# Patient Record
Sex: FEMALE | Race: WHITE | NOT HISPANIC OR LATINO | Employment: OTHER | URBAN - METROPOLITAN AREA
[De-identification: names, ages, dates, MRNs, and addresses within clinical notes are randomized per-mention and may not be internally consistent; named-entity substitution may affect disease eponyms.]

---

## 2017-01-16 RX ORDER — THYROID,PORK 15 MG
TABLET ORAL
Qty: 90 TABLET | Refills: 0 | Status: SHIPPED | OUTPATIENT
Start: 2017-01-16 | End: 2017-02-14 | Stop reason: SDUPTHER

## 2017-01-16 RX ORDER — VENLAFAXINE HYDROCHLORIDE 75 MG/1
CAPSULE, EXTENDED RELEASE ORAL
Qty: 90 CAPSULE | Refills: 0 | Status: SHIPPED | OUTPATIENT
Start: 2017-01-16 | End: 2017-02-14 | Stop reason: SDUPTHER

## 2017-02-14 RX ORDER — HYDROXYZINE HYDROCHLORIDE 25 MG/1
25 TABLET, FILM COATED ORAL EVERY 8 HOURS PRN
Qty: 90 TABLET | Refills: 0 | Status: SHIPPED | OUTPATIENT
Start: 2017-02-14 | End: 2018-09-11 | Stop reason: SDUPTHER

## 2017-02-14 RX ORDER — TRIAMCINOLONE ACETONIDE 1 MG/G
1 CREAM TOPICAL 2 TIMES DAILY
Qty: 15 G | Refills: 0 | Status: SHIPPED | OUTPATIENT
Start: 2017-02-14 | End: 2017-07-11 | Stop reason: SDUPTHER

## 2017-02-14 RX ORDER — LEVOTHYROXINE AND LIOTHYRONINE 9.5; 2.25 UG/1; UG/1
15 TABLET ORAL DAILY
Qty: 90 TABLET | Refills: 0 | Status: SHIPPED | OUTPATIENT
Start: 2017-02-14 | End: 2017-02-20 | Stop reason: SDUPTHER

## 2017-02-14 RX ORDER — VENLAFAXINE HYDROCHLORIDE 75 MG/1
75 CAPSULE, EXTENDED RELEASE ORAL DAILY
Qty: 90 CAPSULE | Refills: 0 | Status: SHIPPED | OUTPATIENT
Start: 2017-02-14 | End: 2017-02-28 | Stop reason: SDUPTHER

## 2017-02-14 RX ORDER — SUMATRIPTAN 50 MG/1
TABLET, FILM COATED ORAL
Qty: 9 TABLET | Refills: 3 | Status: SHIPPED | OUTPATIENT
Start: 2017-02-14 | End: 2017-02-28 | Stop reason: SDUPTHER

## 2017-02-16 RX ORDER — THYROID,PORK 15 MG
TABLET ORAL
Qty: 90 TABLET | Refills: 0 | Status: SHIPPED | OUTPATIENT
Start: 2017-02-16 | End: 2017-02-21 | Stop reason: SDUPTHER

## 2017-02-20 RX ORDER — LEVOTHYROXINE AND LIOTHYRONINE 9.5; 2.25 UG/1; UG/1
15 TABLET ORAL DAILY
Qty: 135 TABLET | Refills: 0 | Status: SHIPPED | OUTPATIENT
Start: 2017-02-20 | End: 2017-02-28 | Stop reason: SDUPTHER

## 2017-02-21 RX ORDER — LEVOTHYROXINE AND LIOTHYRONINE 9.5; 2.25 UG/1; UG/1
TABLET ORAL
Qty: 90 TABLET | Refills: 2 | Status: SHIPPED | OUTPATIENT
Start: 2017-02-21 | End: 2017-03-27 | Stop reason: SDUPTHER

## 2017-02-28 RX ORDER — SUMATRIPTAN 50 MG/1
TABLET, FILM COATED ORAL
Qty: 9 TABLET | Refills: 3 | Status: SHIPPED | OUTPATIENT
Start: 2017-02-28 | End: 2018-07-02 | Stop reason: SDUPTHER

## 2017-02-28 RX ORDER — LEVOTHYROXINE AND LIOTHYRONINE 9.5; 2.25 UG/1; UG/1
15 TABLET ORAL DAILY
Qty: 90 TABLET | Refills: 0 | Status: SHIPPED | OUTPATIENT
Start: 2017-02-28 | End: 2017-03-27 | Stop reason: SDUPTHER

## 2017-02-28 RX ORDER — VENLAFAXINE HYDROCHLORIDE 75 MG/1
75 CAPSULE, EXTENDED RELEASE ORAL DAILY
Qty: 90 CAPSULE | Refills: 0 | Status: SHIPPED | OUTPATIENT
Start: 2017-02-28 | End: 2017-07-10 | Stop reason: SDUPTHER

## 2017-03-27 RX ORDER — LEVOTHYROXINE AND LIOTHYRONINE 9.5; 2.25 UG/1; UG/1
15 TABLET ORAL 3 TIMES DAILY
Qty: 270 TABLET | Refills: 0 | Status: SHIPPED | OUTPATIENT
Start: 2017-03-27 | End: 2017-07-10 | Stop reason: SDUPTHER

## 2017-07-10 RX ORDER — LEVOTHYROXINE AND LIOTHYRONINE 9.5; 2.25 UG/1; UG/1
15 TABLET ORAL 3 TIMES DAILY
Qty: 270 TABLET | Refills: 0 | Status: SHIPPED | OUTPATIENT
Start: 2017-07-10 | End: 2017-07-10 | Stop reason: SDUPTHER

## 2017-07-10 RX ORDER — VENLAFAXINE HYDROCHLORIDE 75 MG/1
75 CAPSULE, EXTENDED RELEASE ORAL DAILY
Qty: 90 CAPSULE | Refills: 0 | Status: SHIPPED | OUTPATIENT
Start: 2017-07-10 | End: 2017-07-27 | Stop reason: SDUPTHER

## 2017-07-10 RX ORDER — VENLAFAXINE HYDROCHLORIDE 75 MG/1
75 CAPSULE, EXTENDED RELEASE ORAL DAILY
Qty: 90 CAPSULE | Refills: 0 | Status: SHIPPED | OUTPATIENT
Start: 2017-07-10 | End: 2017-07-10 | Stop reason: SDUPTHER

## 2017-07-10 RX ORDER — LEVOTHYROXINE AND LIOTHYRONINE 9.5; 2.25 UG/1; UG/1
15 TABLET ORAL 3 TIMES DAILY
Qty: 270 TABLET | Refills: 0 | Status: SHIPPED | OUTPATIENT
Start: 2017-07-10 | End: 2017-07-27 | Stop reason: SDUPTHER

## 2017-07-11 RX ORDER — TRIAMCINOLONE ACETONIDE 1 MG/G
1 CREAM TOPICAL 2 TIMES DAILY
Qty: 45 G | Refills: 2 | Status: SHIPPED | OUTPATIENT
Start: 2017-07-11 | End: 2018-07-17 | Stop reason: SDUPTHER

## 2017-07-27 RX ORDER — LEVOTHYROXINE AND LIOTHYRONINE 9.5; 2.25 UG/1; UG/1
15 TABLET ORAL 3 TIMES DAILY
Qty: 15 TABLET | Refills: 0 | Status: SHIPPED | OUTPATIENT
Start: 2017-07-27 | End: 2017-10-16 | Stop reason: SDUPTHER

## 2017-07-27 RX ORDER — VENLAFAXINE HYDROCHLORIDE 75 MG/1
75 CAPSULE, EXTENDED RELEASE ORAL DAILY
Qty: 5 CAPSULE | Refills: 0 | Status: SHIPPED | OUTPATIENT
Start: 2017-07-27 | End: 2017-10-16 | Stop reason: SDUPTHER

## 2017-10-17 RX ORDER — VENLAFAXINE HYDROCHLORIDE 75 MG/1
CAPSULE, EXTENDED RELEASE ORAL
Qty: 90 CAPSULE | Refills: 1 | Status: SHIPPED | OUTPATIENT
Start: 2017-10-17 | End: 2018-04-14 | Stop reason: SDUPTHER

## 2017-10-17 RX ORDER — THYROID,PORK 15 MG
TABLET ORAL
Qty: 270 TABLET | Refills: 1 | Status: SHIPPED | OUTPATIENT
Start: 2017-10-17 | End: 2018-04-14 | Stop reason: SDUPTHER

## 2017-10-26 ENCOUNTER — OFFICE VISIT (OUTPATIENT)
Dept: FAMILY MEDICINE CLINIC | Facility: CLINIC | Age: 72
End: 2017-10-26

## 2017-10-26 VITALS
BODY MASS INDEX: 21.5 KG/M2 | HEIGHT: 67 IN | OXYGEN SATURATION: 98 % | RESPIRATION RATE: 16 BRPM | SYSTOLIC BLOOD PRESSURE: 114 MMHG | WEIGHT: 137 LBS | DIASTOLIC BLOOD PRESSURE: 70 MMHG | HEART RATE: 68 BPM

## 2017-10-26 DIAGNOSIS — E78.2 MIXED HYPERLIPIDEMIA: ICD-10-CM

## 2017-10-26 DIAGNOSIS — Z12.39 SCREENING BREAST EXAMINATION: ICD-10-CM

## 2017-10-26 DIAGNOSIS — M79.671 PAIN IN RIGHT FOOT: ICD-10-CM

## 2017-10-26 DIAGNOSIS — E03.8 OTHER SPECIFIED HYPOTHYROIDISM: ICD-10-CM

## 2017-10-26 DIAGNOSIS — Z00.00 ROUTINE GENERAL MEDICAL EXAMINATION AT A HEALTH CARE FACILITY: Primary | ICD-10-CM

## 2017-10-26 DIAGNOSIS — M70.22 OLECRANON BURSITIS OF LEFT ELBOW: ICD-10-CM

## 2017-10-26 DIAGNOSIS — Z23 NEED FOR VACCINATION: ICD-10-CM

## 2017-10-26 PROBLEM — E03.9 HYPOTHYROIDISM: Status: ACTIVE | Noted: 2017-10-26

## 2017-10-26 LAB
ALBUMIN SERPL-MCNC: 4.5 G/DL (ref 3.5–5.2)
ALBUMIN/GLOB SERPL: 2 G/DL
ALP SERPL-CCNC: 64 U/L (ref 39–117)
ALT SERPL-CCNC: 12 U/L (ref 1–33)
AST SERPL-CCNC: 12 U/L (ref 1–32)
BILIRUB SERPL-MCNC: 0.3 MG/DL (ref 0.1–1.2)
BUN SERPL-MCNC: 15 MG/DL (ref 8–23)
BUN/CREAT SERPL: 21.1 (ref 7–25)
CALCIUM SERPL-MCNC: 10.1 MG/DL (ref 8.6–10.5)
CHLORIDE SERPL-SCNC: 100 MMOL/L (ref 98–107)
CHOLEST SERPL-MCNC: 181 MG/DL (ref 0–200)
CHOLEST/HDLC SERPL: 2.7 {RATIO}
CO2 SERPL-SCNC: 26 MMOL/L (ref 22–29)
CREAT SERPL-MCNC: 0.71 MG/DL (ref 0.57–1)
GFR SERPLBLD CREATININE-BSD FMLA CKD-EPI: 81 ML/MIN/1.73
GFR SERPLBLD CREATININE-BSD FMLA CKD-EPI: 98 ML/MIN/1.73
GLOBULIN SER CALC-MCNC: 2.2 GM/DL
GLUCOSE SERPL-MCNC: 95 MG/DL (ref 65–99)
HDLC SERPL-MCNC: 67 MG/DL (ref 40–60)
LDLC SERPL CALC-MCNC: 97 MG/DL (ref 0–100)
POTASSIUM SERPL-SCNC: 5.3 MMOL/L (ref 3.5–5.2)
PROT SERPL-MCNC: 6.7 G/DL (ref 6–8.5)
SODIUM SERPL-SCNC: 138 MMOL/L (ref 136–145)
TRIGL SERPL-MCNC: 86 MG/DL (ref 0–150)
TSH SERPL DL<=0.005 MIU/L-ACNC: 1.14 MIU/ML (ref 0.27–4.2)
URATE SERPL-MCNC: 3 MG/DL (ref 2.4–5.7)
VLDLC SERPL CALC-MCNC: 17.2 MG/DL (ref 5–40)

## 2017-10-26 PROCEDURE — G0439 PPPS, SUBSEQ VISIT: HCPCS | Performed by: FAMILY MEDICINE

## 2017-10-26 PROCEDURE — 90662 IIV NO PRSV INCREASED AG IM: CPT | Performed by: FAMILY MEDICINE

## 2017-10-26 PROCEDURE — 99213 OFFICE O/P EST LOW 20 MIN: CPT | Performed by: FAMILY MEDICINE

## 2017-10-26 PROCEDURE — G0008 ADMIN INFLUENZA VIRUS VAC: HCPCS | Performed by: FAMILY MEDICINE

## 2017-10-26 RX ORDER — PHENOL 1.4 %
600 AEROSOL, SPRAY (ML) MUCOUS MEMBRANE 2 TIMES DAILY WITH MEALS
COMMUNITY
End: 2020-04-21

## 2017-10-26 NOTE — PATIENT INSTRUCTIONS
Medicare Wellness  Personal Prevention Plan of Service   I will call you with lab and imaging results.   Date of Office Visit:  10/26/2017  Encounter Provider:  Jorge Vázquez MD  Place of Service:  [facility]  Patient Name: Rita Hendrix  :  1945    As part of the Medicare Wellness portion of your visit today, we are providing you with this personalized preventive plan of services (PPPS). This plan is based upon recommendations of the [place] Preventive Services Task Force (USPSTF) and the Advisory Committee on Immunization Practices (ACIP).    This lists the preventive care services that should be considered, and provides dates of when you are due. Items listed as completed are up-to-date and do not require any further intervention.    Health Maintenance   Topic Date Due   • MAMMOGRAM  2016   • MEDICARE ANNUAL WELLNESS  10/26/2018   • LIPID PANEL  10/26/2018   • TDAP/TD VACCINES (2 - Td) 02/10/2022   • COLONOSCOPY  2023   • HEPATITIS C SCREENING  Completed   • INFLUENZA VACCINE  Completed   • PNEUMOCOCCAL VACCINES (65+ LOW/MEDIUM RISK)  Completed   • ZOSTER VACCINE  Completed       Orders Placed This Encounter   Procedures   • Mammo Screening Bilateral With CAD     Standing Status:   Future     Standing Expiration Date:   10/27/2018     Order Specific Question:   Reason for Exam:     Answer:   screening   • Flu Vaccine High Dose PF 65YR+ ([id number])   • Comprehensive Metabolic Panel   • Lipid Panel With / Chol / HDL Ratio   • TSH   • Uric Acid   • Ambulatory Referral to Orthopedic Surgery     Referral Priority:   Routine     Referral Type:   Consultation     Referral Reason:   Specialty Services Required     Referred to Provider:   John Kulkarni MD     Requested Specialty:   Orthopedic Surgery     Number of Visits Requested:   1       Return in about 1 year (around 10/26/2018) for Annual physical.

## 2017-10-26 NOTE — PROGRESS NOTES
Medicare Subsequent Wellness Visit  Subjective   History of Present Illness    Rita Hendrix is a 72 y.o. female who presents for an Medicare Wellness Visit. In addition, we addressed the following health issues:  1)Olecranon Bursitis x 1 mo.  Has been using ice and Ibu. It is not hot but swollen and very firm. It is somewhat painful. She hit it once while remodeling recently.  2) She reports soreness Rt lateral foot and mild pain in right toe. She is worried about possible gout.   3)Rita has a history of chronic hypothyroidism and has been well controlled on current dose of replacement.She report no concerning symptoms: no cold intolerance, fatigue or hair loss.         PMH, PSH, SocHx, FamHx, Allergies, and Medications: Reviewed and updated in the history section of chart.  Family History   Problem Relation Age of Onset   • Stroke Mother    • Parkinsonism Mother    • Cancer Father      Pancreatic       Social History     Social History Narrative    Lives at home with        Allergies   Allergen Reactions   • Penicillins Hives   • Sulfa Antibiotics Itching       Outpatient Medications Prior to Visit   Medication Sig Dispense Refill   • ARMOUR THYROID 15 MG tablet TAKE 1 TABLET THREE TIMES DAILY 270 tablet 1   • venlafaxine XR (EFFEXOR-XR) 75 MG 24 hr capsule TAKE 1 CAPSULE EVERY DAY (NEED MD APPOINTMENT) 90 capsule 1   • hydrOXYzine (ATARAX) 25 MG tablet Take 1 tablet by mouth Every 8 (Eight) Hours As Needed for itching. 90 tablet 0   • SUMAtriptan (IMITREX) 50 MG tablet Take one tablet at onset of headache. May repeat dose one time in 2 hours if headache not relieved. 9 tablet 3   • triamcinolone (KENALOG) 0.1 % cream Apply 1 application topically 2 (Two) Times a Day. 45 g 2     No facility-administered medications prior to visit.         Patient Active Problem List   Diagnosis   • Hypothyroidism   • Mixed hyperlipidemia         Patient Care Team:  Jorge Vázquez MD as PCP - General  Bradford Lyn MD  as PCP - Claims Attributed  Recent Hospitalizations:  none    Age-appropriate Screening Schedule:  Refer to the list below for future screening recommendations based on patient's age. Orders for these recommended tests are listed in the plan section. The patient has been provided with a written plan.    Health Maintenance   Topic Date Due   • MAMMOGRAM  05/05/2016   • MEDICARE ANNUAL WELLNESS  10/26/2018   • LIPID PANEL  10/26/2018   • TDAP/TD VACCINES (2 - Td) 02/10/2022   • COLONOSCOPY  03/08/2023   • HEPATITIS C SCREENING  Completed   • INFLUENZA VACCINE  Completed   • PNEUMOCOCCAL VACCINES (65+ LOW/MEDIUM RISK)  Completed   • ZOSTER VACCINE  Completed       Depression Screen:   PHQ-2/PHQ-9 Depression Screening 10/26/2017   Little interest or pleasure in doing things 0   Feeling down, depressed, or hopeless 0   Total Score 0       Functional and Cognitive Screening:  Functional & Cognitive Status 10/26/2017   Do you have difficulty preparing food and eating? No   Do you have difficulty bathing yourself, getting dressed or grooming yourself? No   Do you have difficulty using the toilet? No   Do you have difficulty moving around from place to place? No   Do you have trouble with steps or getting out of a bed or a chair? No   In the past year have you fallen or experienced a near fall? Yes   Current Diet Well Balanced Diet   Dental Exam Up to date   Eye Exam Up to date   Exercise (times per week) 2 times per week   Current Exercise Activities Include Swimming   Do you need help using the phone?  No   Are you deaf or do you have serious difficulty hearing?  No   Do you need help with transportation? No   Do you need help shopping? No   Do you need help preparing meals?  No   Do you need help with housework?  No   Do you need help taking your medications? No   Do you need help managing money? No   Have you felt unusual stress, anger or loneliness in the last month? No   Who do you live with? Spouse   If you need help,  "do you have trouble finding someone available to you? No   Have you been bothered in the last four weeks by sexual problems? No   Do you have difficulty concentrating, remembering or making decisions? No     Does the patient have evidence of cognitive impairment? none      Review of Systems   All other systems reviewed and are negative.      Objective     Vitals:    10/26/17 0901   BP: 114/70   Pulse: 68   Resp: 16   SpO2: 98%   Weight: 137 lb (62.1 kg)   Height: 66.5\" (168.9 cm)       Body mass index is 21.78 kg/(m^2).    PHYSICAL EXAM  Vitals reviewed and on chart.  HEENT: PERRLA, EOMI. Oral mucosa moist,   No LAD.  CV: RRR, no murmurs, rubs, clicks or gallops  LUNGS: CTA bilaterally  EXT: Left elbow has large, swollen bursa, no redness or warmth, FROM in bilateral upper and lower ext  NEURO: CN II - XII grossly intact        ASSESSMENT AND PLAN      Problem List Items Addressed This Visit        Cardiovascular and Mediastinum    Mixed hyperlipidemia    Relevant Orders    Lipid Panel With / Chol / HDL Ratio (Completed)       Endocrine    Hypothyroidism    Relevant Orders    TSH (Completed)  Well managed on current dose of thyroid replacement. TSH reviewed and on chart.  Well refill medications as needed.      Other Visit Diagnoses     Routine general medical examination at a health care facility    -  Primary    Relevant Orders    Comprehensive Metabolic Panel (Completed)    Olecranon bursitis of left elbow        Relevant Orders  Wrapped elbow with coband tape and referred.    Ambulatory Referral to Orthopedic Surgery (Completed)    Pain in right foot        Relevant Orders    Uric Acid (Completed)    Screening breast examination        Relevant Orders    Mammo Screening Bilateral With CAD    Need for vaccination        Relevant Orders    Flu Vaccine High Dose PF 65YR+ ([id number]) (Completed)          Orders:  Orders Placed This Encounter   Procedures   • Mammo Screening Bilateral With CAD   • Flu Vaccine High " Dose PF 65YR+ ([id number])   • Comprehensive Metabolic Panel   • Lipid Panel With / Chol / HDL Ratio   • TSH   • Uric Acid   • Ambulatory Referral to Orthopedic Surgery       Follow Up:  Return in about 1 year (around 10/26/2018) for Annual physical.     ADVANCED DIRECTIVES: Advised    An After Visit Summary and PPPS with all of these plans were given to the patient.

## 2017-10-31 DIAGNOSIS — E87.5 HYPERPOTASSEMIA: Primary | ICD-10-CM

## 2017-11-02 LAB — POTASSIUM SERPL-SCNC: 4.4 MMOL/L (ref 3.5–5.2)

## 2017-11-08 ENCOUNTER — APPOINTMENT (OUTPATIENT)
Dept: WOMENS IMAGING | Facility: HOSPITAL | Age: 72
End: 2017-11-08

## 2017-11-08 PROCEDURE — G0202 SCR MAMMO BI INCL CAD: HCPCS | Performed by: RADIOLOGY

## 2017-11-08 PROCEDURE — 77063 BREAST TOMOSYNTHESIS BI: CPT | Performed by: RADIOLOGY

## 2018-04-16 RX ORDER — THYROID,PORK 15 MG
TABLET ORAL
Qty: 270 TABLET | Refills: 1 | Status: SHIPPED | OUTPATIENT
Start: 2018-04-16 | End: 2018-10-01 | Stop reason: SDUPTHER

## 2018-04-16 RX ORDER — VENLAFAXINE HYDROCHLORIDE 75 MG/1
CAPSULE, EXTENDED RELEASE ORAL
Qty: 90 CAPSULE | Refills: 1 | Status: SHIPPED | OUTPATIENT
Start: 2018-04-16 | End: 2018-10-01 | Stop reason: SDUPTHER

## 2018-07-02 RX ORDER — SUMATRIPTAN 50 MG/1
TABLET, FILM COATED ORAL
Qty: 27 TABLET | Refills: 3 | Status: SHIPPED | OUTPATIENT
Start: 2018-07-02 | End: 2019-02-28 | Stop reason: SDUPTHER

## 2018-07-17 RX ORDER — TRIAMCINOLONE ACETONIDE 1 MG/G
CREAM TOPICAL
Qty: 45 G | Refills: 2 | Status: SHIPPED | OUTPATIENT
Start: 2018-07-17 | End: 2018-12-03 | Stop reason: SDUPTHER

## 2018-07-24 VITALS
SYSTOLIC BLOOD PRESSURE: 136 MMHG | RESPIRATION RATE: 17 BRPM | OXYGEN SATURATION: 94 % | DIASTOLIC BLOOD PRESSURE: 75 MMHG | RESPIRATION RATE: 21 BRPM | HEART RATE: 80 BPM | HEIGHT: 67 IN | HEART RATE: 76 BPM | RESPIRATION RATE: 20 BRPM | HEART RATE: 74 BPM | OXYGEN SATURATION: 97 % | SYSTOLIC BLOOD PRESSURE: 123 MMHG | DIASTOLIC BLOOD PRESSURE: 77 MMHG | TEMPERATURE: 96.8 F | TEMPERATURE: 98.7 F | DIASTOLIC BLOOD PRESSURE: 65 MMHG | HEART RATE: 83 BPM | OXYGEN SATURATION: 98 % | OXYGEN SATURATION: 85 % | DIASTOLIC BLOOD PRESSURE: 70 MMHG | OXYGEN SATURATION: 93 % | OXYGEN SATURATION: 91 % | SYSTOLIC BLOOD PRESSURE: 118 MMHG | DIASTOLIC BLOOD PRESSURE: 88 MMHG | HEART RATE: 71 BPM | SYSTOLIC BLOOD PRESSURE: 122 MMHG | HEART RATE: 77 BPM | RESPIRATION RATE: 23 BRPM | RESPIRATION RATE: 25 BRPM | RESPIRATION RATE: 18 BRPM | RESPIRATION RATE: 16 BRPM | SYSTOLIC BLOOD PRESSURE: 144 MMHG | SYSTOLIC BLOOD PRESSURE: 116 MMHG | WEIGHT: 135 LBS | DIASTOLIC BLOOD PRESSURE: 67 MMHG | RESPIRATION RATE: 26 BRPM | DIASTOLIC BLOOD PRESSURE: 68 MMHG | HEART RATE: 67 BPM | OXYGEN SATURATION: 100 % | SYSTOLIC BLOOD PRESSURE: 131 MMHG | SYSTOLIC BLOOD PRESSURE: 142 MMHG | DIASTOLIC BLOOD PRESSURE: 73 MMHG | HEART RATE: 86 BPM | SYSTOLIC BLOOD PRESSURE: 125 MMHG | HEART RATE: 78 BPM

## 2018-07-25 PROBLEM — Z86.010 PERSONAL HISTORY OF COLONIC POLYPS: Status: ACTIVE | Noted: 2018-07-26

## 2018-07-26 ENCOUNTER — AMBULATORY SURGICAL CENTER (AMBULATORY)
Dept: URBAN - METROPOLITAN AREA SURGERY 17 | Facility: SURGERY | Age: 73
End: 2018-07-26
Payer: MEDICARE

## 2018-07-26 DIAGNOSIS — Z86.010 PERSONAL HISTORY OF COLONIC POLYPS: ICD-10-CM

## 2018-07-26 PROCEDURE — G0105 COLORECTAL SCRN; HI RISK IND: HCPCS | Performed by: INTERNAL MEDICINE

## 2018-07-26 RX ADMIN — PROPOFOL 50 MG: 10 INJECTION, EMULSION INTRAVENOUS at 13:37

## 2018-07-26 RX ADMIN — PROPOFOL 100 MG: 10 INJECTION, EMULSION INTRAVENOUS at 13:32

## 2018-07-26 RX ADMIN — PROPOFOL 50 MG: 10 INJECTION, EMULSION INTRAVENOUS at 13:43

## 2018-07-26 RX ADMIN — LIDOCAINE HYDROCHLORIDE 25 MG: 10 INJECTION, SOLUTION EPIDURAL; INFILTRATION; INTRACAUDAL; PERINEURAL at 13:32

## 2018-07-26 RX ADMIN — PROPOFOL 25 MG: 10 INJECTION, EMULSION INTRAVENOUS at 13:40

## 2018-07-26 RX ADMIN — PROPOFOL 25 MG: 10 INJECTION, EMULSION INTRAVENOUS at 13:37

## 2018-09-11 RX ORDER — HYDROXYZINE HYDROCHLORIDE 25 MG/1
25 TABLET, FILM COATED ORAL EVERY 8 HOURS PRN
Qty: 90 TABLET | Refills: 0 | Status: SHIPPED | OUTPATIENT
Start: 2018-09-11 | End: 2019-02-28 | Stop reason: SDUPTHER

## 2018-10-02 RX ORDER — THYROID,PORK 15 MG
TABLET ORAL
Qty: 270 TABLET | Refills: 0 | Status: SHIPPED | OUTPATIENT
Start: 2018-10-02 | End: 2018-12-01 | Stop reason: SDUPTHER

## 2018-10-02 RX ORDER — VENLAFAXINE HYDROCHLORIDE 75 MG/1
CAPSULE, EXTENDED RELEASE ORAL
Qty: 90 CAPSULE | Refills: 0 | Status: SHIPPED | OUTPATIENT
Start: 2018-10-02 | End: 2018-12-01 | Stop reason: SDUPTHER

## 2018-12-03 RX ORDER — TRIAMCINOLONE ACETONIDE 1 MG/G
CREAM TOPICAL 2 TIMES DAILY
Qty: 453.6 G | Refills: 0 | Status: SHIPPED | OUTPATIENT
Start: 2018-12-03 | End: 2022-08-29

## 2018-12-03 RX ORDER — THYROID,PORK 15 MG
TABLET ORAL
Qty: 30 TABLET | Refills: 0 | Status: SHIPPED | OUTPATIENT
Start: 2018-12-03 | End: 2019-01-11 | Stop reason: SDUPTHER

## 2018-12-03 RX ORDER — VENLAFAXINE HYDROCHLORIDE 75 MG/1
CAPSULE, EXTENDED RELEASE ORAL
Qty: 30 CAPSULE | Refills: 0 | Status: SHIPPED | OUTPATIENT
Start: 2018-12-03 | End: 2019-01-11 | Stop reason: SDUPTHER

## 2019-01-11 RX ORDER — LEVOTHYROXINE AND LIOTHYRONINE 9.5; 2.25 UG/1; UG/1
15 TABLET ORAL 3 TIMES DAILY
Qty: 90 TABLET | Refills: 1 | Status: SHIPPED | OUTPATIENT
Start: 2019-01-11 | End: 2019-01-28 | Stop reason: SDUPTHER

## 2019-01-11 RX ORDER — VENLAFAXINE HYDROCHLORIDE 75 MG/1
75 CAPSULE, EXTENDED RELEASE ORAL DAILY
Qty: 30 CAPSULE | Refills: 1 | Status: SHIPPED | OUTPATIENT
Start: 2019-01-11 | End: 2019-02-28 | Stop reason: SDUPTHER

## 2019-01-11 NOTE — TELEPHONE ENCOUNTER
Ms. Hendrix contacted and made aware that enough medication was sent in to get her to her appointment. She states that [facility] thyroid needs approved, contact number is [phone].

## 2019-01-15 NOTE — TELEPHONE ENCOUNTER
I've called [facility] and mediation it's been approved. Ref number is [id number].   Pt informed.

## 2019-01-28 RX ORDER — LEVOTHYROXINE AND LIOTHYRONINE 9.5; 2.25 UG/1; UG/1
15 TABLET ORAL 3 TIMES DAILY
Qty: 90 TABLET | Refills: 5 | Status: SHIPPED | OUTPATIENT
Start: 2019-01-28 | End: 2019-02-28 | Stop reason: SDUPTHER

## 2019-02-21 DIAGNOSIS — Z13.1 SCREENING FOR DIABETES MELLITUS: Primary | ICD-10-CM

## 2019-02-21 DIAGNOSIS — R53.83 FATIGUE, UNSPECIFIED TYPE: ICD-10-CM

## 2019-02-21 DIAGNOSIS — E03.9 HYPOTHYROIDISM, UNSPECIFIED TYPE: ICD-10-CM

## 2019-02-21 DIAGNOSIS — Z13.6 SCREENING FOR ISCHEMIC HEART DISEASE: ICD-10-CM

## 2019-02-26 LAB
ALBUMIN SERPL-MCNC: 4.3 G/DL (ref 3.5–5.2)
ALBUMIN/GLOB SERPL: 1.8 G/DL
ALP SERPL-CCNC: 54 U/L (ref 39–117)
ALT SERPL-CCNC: 14 U/L (ref 1–33)
AST SERPL-CCNC: 12 U/L (ref 1–32)
BILIRUB SERPL-MCNC: 0.3 MG/DL (ref 0.1–1.2)
BUN SERPL-MCNC: 10 MG/DL (ref 8–23)
BUN/CREAT SERPL: 15.9 (ref 7–25)
CALCIUM SERPL-MCNC: 9.6 MG/DL (ref 8.6–10.5)
CHLORIDE SERPL-SCNC: 103 MMOL/L (ref 98–107)
CHOLEST SERPL-MCNC: 201 MG/DL (ref 0–200)
CO2 SERPL-SCNC: 25.2 MMOL/L (ref 22–29)
CREAT SERPL-MCNC: 0.63 MG/DL (ref 0.57–1)
ERYTHROCYTE [DISTWIDTH] IN BLOOD BY AUTOMATED COUNT: 14.6 % (ref 12.3–15.4)
GLOBULIN SER CALC-MCNC: 2.4 GM/DL
GLUCOSE SERPL-MCNC: 92 MG/DL (ref 65–99)
HCT VFR BLD AUTO: 44.8 % (ref 34–46.6)
HDLC SERPL-MCNC: 66 MG/DL (ref 40–60)
HGB BLD-MCNC: 14.5 G/DL (ref 12–15.9)
LDLC SERPL CALC-MCNC: 117 MG/DL (ref 0–100)
LDLC/HDLC SERPL: 1.78 {RATIO}
MCH RBC QN AUTO: 30.7 PG (ref 26.6–33)
MCHC RBC AUTO-ENTMCNC: 32.4 G/DL (ref 31.5–35.7)
MCV RBC AUTO: 94.7 FL (ref 79–97)
PLATELET # BLD AUTO: 307 10*3/MM3 (ref 140–450)
POTASSIUM SERPL-SCNC: 4.3 MMOL/L (ref 3.5–5.2)
PROT SERPL-MCNC: 6.7 G/DL (ref 6–8.5)
RBC # BLD AUTO: 4.73 10*6/MM3 (ref 3.77–5.28)
SODIUM SERPL-SCNC: 139 MMOL/L (ref 136–145)
TRIGL SERPL-MCNC: 89 MG/DL (ref 0–150)
TSH SERPL DL<=0.005 MIU/L-ACNC: 0.9 MIU/ML (ref 0.27–4.2)
VLDLC SERPL CALC-MCNC: 17.8 MG/DL (ref 5–40)
WBC # BLD AUTO: 6.2 10*3/MM3 (ref 3.4–10.8)

## 2019-02-28 ENCOUNTER — OFFICE VISIT (OUTPATIENT)
Dept: FAMILY MEDICINE CLINIC | Facility: CLINIC | Age: 74
End: 2019-02-28

## 2019-02-28 VITALS
DIASTOLIC BLOOD PRESSURE: 80 MMHG | HEIGHT: 67 IN | WEIGHT: 144 LBS | OXYGEN SATURATION: 98 % | RESPIRATION RATE: 16 BRPM | BODY MASS INDEX: 22.6 KG/M2 | HEART RATE: 76 BPM | SYSTOLIC BLOOD PRESSURE: 120 MMHG

## 2019-02-28 DIAGNOSIS — E78.2 MIXED HYPERLIPIDEMIA: ICD-10-CM

## 2019-02-28 DIAGNOSIS — Z86.69 HX OF MIGRAINES: ICD-10-CM

## 2019-02-28 DIAGNOSIS — Z00.00 MEDICARE ANNUAL WELLNESS VISIT, SUBSEQUENT: Primary | ICD-10-CM

## 2019-02-28 DIAGNOSIS — F41.9 ANXIETY: ICD-10-CM

## 2019-02-28 DIAGNOSIS — L29.9 ITCHING: ICD-10-CM

## 2019-02-28 DIAGNOSIS — D69.2 PURPURA SENILIS (HCC): ICD-10-CM

## 2019-02-28 DIAGNOSIS — E03.8 OTHER SPECIFIED HYPOTHYROIDISM: ICD-10-CM

## 2019-02-28 PROCEDURE — 99213 OFFICE O/P EST LOW 20 MIN: CPT | Performed by: FAMILY MEDICINE

## 2019-02-28 PROCEDURE — G0439 PPPS, SUBSEQ VISIT: HCPCS | Performed by: FAMILY MEDICINE

## 2019-02-28 RX ORDER — LEVOTHYROXINE AND LIOTHYRONINE 9.5; 2.25 UG/1; UG/1
15 TABLET ORAL 3 TIMES DAILY
Qty: 270 TABLET | Refills: 3 | Status: CANCELLED | OUTPATIENT
Start: 2019-02-28

## 2019-02-28 RX ORDER — SUMATRIPTAN 50 MG/1
TABLET, FILM COATED ORAL
Qty: 27 TABLET | Refills: 3 | Status: SHIPPED | OUTPATIENT
Start: 2019-02-28

## 2019-02-28 RX ORDER — VENLAFAXINE HYDROCHLORIDE 75 MG/1
75 CAPSULE, EXTENDED RELEASE ORAL DAILY
Qty: 90 CAPSULE | Refills: 3 | Status: SHIPPED | OUTPATIENT
Start: 2019-02-28 | End: 2020-01-13 | Stop reason: SDUPTHER

## 2019-02-28 RX ORDER — LEVOTHYROXINE AND LIOTHYRONINE 9.5; 2.25 UG/1; UG/1
15 TABLET ORAL 3 TIMES DAILY
Qty: 270 TABLET | Refills: 3 | Status: SHIPPED | OUTPATIENT
Start: 2019-02-28 | End: 2020-03-18 | Stop reason: SDUPTHER

## 2019-02-28 RX ORDER — HYDROXYZINE HYDROCHLORIDE 25 MG/1
25 TABLET, FILM COATED ORAL EVERY 8 HOURS PRN
Qty: 90 TABLET | Refills: 3 | Status: SHIPPED | OUTPATIENT
Start: 2019-02-28 | End: 2019-06-21 | Stop reason: SDUPTHER

## 2019-06-21 DIAGNOSIS — L29.9 ITCHING: ICD-10-CM

## 2019-06-21 RX ORDER — HYDROXYZINE HYDROCHLORIDE 25 MG/1
25 TABLET, FILM COATED ORAL EVERY 8 HOURS PRN
Qty: 90 TABLET | Refills: 1 | Status: SHIPPED | OUTPATIENT
Start: 2019-06-21 | End: 2020-12-28

## 2019-07-18 ENCOUNTER — APPOINTMENT (OUTPATIENT)
Dept: WOMENS IMAGING | Facility: HOSPITAL | Age: 74
End: 2019-07-18

## 2019-07-18 PROCEDURE — 77063 BREAST TOMOSYNTHESIS BI: CPT | Performed by: RADIOLOGY

## 2019-07-18 PROCEDURE — 77067 SCR MAMMO BI INCL CAD: CPT | Performed by: RADIOLOGY

## 2019-07-19 RX ORDER — THYROID,PORK 15 MG
TABLET ORAL
Qty: 90 TABLET | Refills: 1 | Status: SHIPPED | OUTPATIENT
Start: 2019-07-19 | End: 2020-01-27 | Stop reason: SDUPTHER

## 2020-01-13 ENCOUNTER — TELEPHONE (OUTPATIENT)
Dept: FAMILY MEDICINE CLINIC | Facility: CLINIC | Age: 75
End: 2020-01-13

## 2020-01-13 DIAGNOSIS — F41.9 ANXIETY: ICD-10-CM

## 2020-01-13 RX ORDER — VENLAFAXINE HYDROCHLORIDE 75 MG/1
75 CAPSULE, EXTENDED RELEASE ORAL DAILY
Qty: 90 CAPSULE | Refills: 0 | Status: SHIPPED | OUTPATIENT
Start: 2020-01-13 | End: 2020-04-10 | Stop reason: SDUPTHER

## 2020-01-13 NOTE — TELEPHONE ENCOUNTER
PATIENT CALLED FOR MED REFILL   venlafaxine XR (EFFEXOR-XR) 75 MG 24 hr capsule  REQUESTING 90 DAY SUPPLY    PHARMACY [facility] [place] [phone]    PATIENT CALL BACK NUMBER [phone]

## 2020-01-23 ENCOUNTER — TELEPHONE (OUTPATIENT)
Dept: FAMILY MEDICINE CLINIC | Facility: CLINIC | Age: 75
End: 2020-01-23

## 2020-01-23 NOTE — TELEPHONE ENCOUNTER
Patient called and requested to speak with Martha about her thyroid (SUSY THYROID) 15 MG tablet Rx.    She stated that this will cost her 140$ for a 30 day prescription and that she cannot afford it. She says that she needs this covered and it has been worked out for her before because this is the only thing that works for her. She stated that she is out of the medication.    She can be contacted at [phone].

## 2020-01-24 DIAGNOSIS — E03.8 OTHER SPECIFIED HYPOTHYROIDISM: ICD-10-CM

## 2020-01-24 RX ORDER — LEVOTHYROXINE AND LIOTHYRONINE 9.5; 2.25 UG/1; UG/1
15 TABLET ORAL DAILY
Qty: 30 TABLET | Refills: 0 | Status: CANCELLED | OUTPATIENT
Start: 2020-01-24

## 2020-01-24 NOTE — TELEPHONE ENCOUNTER
Patient has called to get a status update on her request for the [place] thyroid. She does not want to go the whole weekend not taking her thyroid medication since she know what it will do to her body. She would like to know if a small amount can be called in and she will pay out of pocket for it.  Can a clinical staff member give her a call with a status update? Thank you.     Patient Lolmi-[phone]

## 2020-01-24 NOTE — TELEPHONE ENCOUNTER
[facility] - ID #  [id number]    [email or web address]    Customer service # [phone]    [address]   [address]  [place] [id number]

## 2020-01-24 NOTE — TELEPHONE ENCOUNTER
Can you please look at her chart or call her pharmacy and try to find out who who drug coverage is through.  Nothing would come up on Cover My Meds.  Thanks

## 2020-01-27 ENCOUNTER — TELEPHONE (OUTPATIENT)
Dept: FAMILY MEDICINE CLINIC | Facility: CLINIC | Age: 75
End: 2020-01-27

## 2020-01-27 RX ORDER — LEVOTHYROXINE AND LIOTHYRONINE 9.5; 2.25 UG/1; UG/1
15 TABLET ORAL 3 TIMES DAILY
Qty: 90 TABLET | Refills: 1 | Status: SHIPPED | OUTPATIENT
Start: 2020-01-27 | End: 2020-03-18

## 2020-01-27 NOTE — TELEPHONE ENCOUNTER
Pt called and stated that the selected prescription will be cheaper at the [facility]. I have re ordered the prescription with that Pharmacy attached.    Pt callback- [phone]

## 2020-02-27 ENCOUNTER — TELEPHONE (OUTPATIENT)
Dept: FAMILY MEDICINE CLINIC | Facility: CLINIC | Age: 75
End: 2020-02-27

## 2020-02-27 NOTE — TELEPHONE ENCOUNTER
PATIENT CALLED AND WOULD LIKE TO TALK WITH SOMEONE IN REGARDS TO THYROID MEDICATION DOSAGE AND COST.      PATIENT CALL BACK NUMBER [phone]

## 2020-03-18 DIAGNOSIS — E03.8 OTHER SPECIFIED HYPOTHYROIDISM: ICD-10-CM

## 2020-03-18 RX ORDER — LEVOTHYROXINE AND LIOTHYRONINE 9.5; 2.25 UG/1; UG/1
15 TABLET ORAL 3 TIMES DAILY
Qty: 270 TABLET | Refills: 0 | Status: SHIPPED | OUTPATIENT
Start: 2020-03-18 | End: 2020-04-21 | Stop reason: SDUPTHER

## 2020-03-18 RX ORDER — THYROID,PORK 15 MG
TABLET ORAL
Qty: 90 TABLET | Refills: 0 | Status: SHIPPED | OUTPATIENT
Start: 2020-03-18 | End: 2020-04-10 | Stop reason: SDUPTHER

## 2020-03-18 NOTE — TELEPHONE ENCOUNTER
PATIENT REQUESTED A REFILL ON:thyroid (ARMOUR THYROID) 15 MG tablet    PHARMACY PREFERRED:[address], [place] [address]    PT CAN BE REACHED AT:[phone]

## 2020-03-27 ENCOUNTER — TELEPHONE (OUTPATIENT)
Dept: FAMILY MEDICINE CLINIC | Facility: CLINIC | Age: 75
End: 2020-03-27

## 2020-04-03 ENCOUNTER — TELEPHONE (OUTPATIENT)
Dept: FAMILY MEDICINE CLINIC | Facility: CLINIC | Age: 75
End: 2020-04-03

## 2020-04-03 NOTE — TELEPHONE ENCOUNTER
Patient called in stating that she is going to be mailing in some forms from [employer], makers of Armor Thyroid, she is applying to them about medication, and for 3 months supply, and needs  to fill out that formk for her, and will need it mailed back out if possible

## 2020-04-10 DIAGNOSIS — F41.9 ANXIETY: ICD-10-CM

## 2020-04-10 RX ORDER — VENLAFAXINE HYDROCHLORIDE 75 MG/1
75 CAPSULE, EXTENDED RELEASE ORAL DAILY
Qty: 90 CAPSULE | Refills: 0 | Status: SHIPPED | OUTPATIENT
Start: 2020-04-10 | End: 2020-04-10

## 2020-04-10 RX ORDER — VENLAFAXINE HYDROCHLORIDE 75 MG/1
CAPSULE, EXTENDED RELEASE ORAL
Qty: 90 CAPSULE | Refills: 0 | Status: SHIPPED | OUTPATIENT
Start: 2020-04-10 | End: 2020-07-14

## 2020-04-10 RX ORDER — LEVOTHYROXINE AND LIOTHYRONINE 9.5; 2.25 UG/1; UG/1
15 TABLET ORAL 3 TIMES DAILY
Qty: 90 TABLET | Refills: 0 | Status: SHIPPED | OUTPATIENT
Start: 2020-04-10 | End: 2020-05-05 | Stop reason: SDUPTHER

## 2020-04-10 NOTE — TELEPHONE ENCOUNTER
PATIENT CALLED FOR MED REFILL    venlafaxine XR (EFFEXOR-XR) 75 MG 24 hr capsule  90 DAY SUPPLY    [facility] [place] [place] [address] [facility] - [phone] [place] [phone] [phone]    ---------    ARMOUR THYROID 15 MG tablet    [facility] [place] [address] - [place], [place] - [address] [place] [address] - [phone] [place] [phone] [phone]    60 DAY SUPPLY    PATIENT CALL BACK NUMBER [phone]

## 2020-04-21 ENCOUNTER — OFFICE VISIT (OUTPATIENT)
Dept: FAMILY MEDICINE CLINIC | Facility: CLINIC | Age: 75
End: 2020-04-21

## 2020-04-21 DIAGNOSIS — E78.2 MIXED HYPERLIPIDEMIA: ICD-10-CM

## 2020-04-21 DIAGNOSIS — E03.8 OTHER SPECIFIED HYPOTHYROIDISM: ICD-10-CM

## 2020-04-21 DIAGNOSIS — F41.9 ANXIETY: ICD-10-CM

## 2020-04-21 DIAGNOSIS — D69.2 PURPURA SENILIS (HCC): ICD-10-CM

## 2020-04-21 DIAGNOSIS — Z00.00 MEDICARE ANNUAL WELLNESS VISIT, SUBSEQUENT: Primary | ICD-10-CM

## 2020-04-21 PROCEDURE — G0439 PPPS, SUBSEQ VISIT: HCPCS | Performed by: FAMILY MEDICINE

## 2020-04-21 PROCEDURE — 99214 OFFICE O/P EST MOD 30 MIN: CPT | Performed by: FAMILY MEDICINE

## 2020-04-21 NOTE — PROGRESS NOTES
Medicare Subsequent Wellness Visit  Subjective    You have chosen to receive care through a telehealth visit.  Do you consent to use a video/audio connection for your medical care today? Yes  History of Present Illness    Rita Hendrix is a 74 y.o. female who presents for an Medicare Wellness Visit. In addition, we addressed the following health issues:    Rita has a history of chronic hypothyroidism and has been well controlled on current dose of replacement.She report no concerning symptoms: no cold intolerance, fatigue or hair loss. She is taking [place] Thyroid.      Rita has chronic anxiety-Effexor XR. Initially took this for hot flashes but found it reduced social anxiety.   She has a hx of chronic anxiety and is doing well on current medication. She reports no concerning side effects and feels this medication helps with challenges of daily life.        She has a hx of migraine headaches. This is a chronic problem and responds well to treatment. Takes Imitrex approx 1x month.      Hyperlipidemia. Maternal Grandfather and 2 Aunts had heart issues/high cholestrol. Has not tried a statin in the past.      She has chronic urticaria and has a long hx of a skin rash that is usually related to allergies  and she is taking  Atarax to help with this. It mostly helps with sleep.     Diet:She eats a healthy diet full of vegetables, grains, fiber, and limits meat.  Exercise: Swims or walks 30-45 min, does this 2-3 weeks.     PMH, PSH, SocHx, FamHx, Allergies, and Medications: Reviewed and updated in the history section of chart.  Family History   Problem Relation Age of Onset   • Stroke Mother    • Parkinsonism Mother    • Cancer Father         Pancreatic       Social History     Social History Narrative    Lives at home with        Allergies   Allergen Reactions   • Penicillins Hives   • Sulfa Antibiotics Itching       Outpatient Medications Prior to Visit   Medication Sig Dispense Refill   • hydrOXYzine  (ATARAX) 25 MG tablet Take 1 tablet by mouth Every 8 (Eight) Hours As Needed for Itching. 90 tablet 1   • magnesium oxide (MAGOX) 400 (241.3 Mg) MG tablet tablet Take 400 mg by mouth Daily.     • Probiotic Product (PROBIOTIC ADVANCED PO) Take  by mouth.     • SUMAtriptan (IMITREX) 50 MG tablet Take one tablet at onset of headache. May repeat dose one time in 2 hours if headache not relieved. 27 tablet 3   • thyroid ([place] Thyroid) 15 MG tablet Take 1 tablet by mouth 3 (Three) Times a Day. 90 tablet 0   • triamcinolone (KENALOG) 0.1 % cream Apply  topically to the appropriate area as directed 2 (Two) Times a Day. 453.6 g 0   • venlafaxine XR (EFFEXOR-XR) 75 MG 24 hr capsule TAKE ONE CAPSULE BY MOUTH DAILY 90 capsule 0   • calcium carbonate (OS-MARCOS) 600 MG tablet Take 600 mg by mouth 2 (Two) Times a Day With Meals.     • thyroid ([place] Thyroid) 15 MG tablet Take 1 tablet by mouth 3 (Three) Times a Day. 270 tablet 0     No facility-administered medications prior to visit.         Patient Active Problem List   Diagnosis   • Hypothyroidism   • Mixed hyperlipidemia   • Purpura senilis (CMS/HCC)   • Anxiety         Patient Care Team:  Jorge Vázquez MD as PCP - General  Jorge Vázquez MD as PCP - Claims Attributed  Health Habits:  Current Diet: Well balanced diet  Dental Exam. UTD  Eye Exam. UTD  Exercise:yes  Current exercise activities include:walking and hiking    Recent Hospitalizations:  none    Age-appropriate Screening Schedule:  Refer to the list below for future screening recommendations based on patient's age. Orders for these recommended tests are listed in the plan section. The patient has been provided with a written plan.      Health Maintenance   Topic Date Due   • ZOSTER VACCINE (2 of 3) 02/23/2015   • LIPID PANEL  02/26/2020   • MEDICARE ANNUAL WELLNESS  02/28/2020   • INFLUENZA VACCINE  08/01/2020   • MAMMOGRAM  08/09/2021   • TDAP/TD VACCINES (2 - Td) 02/10/2022   • COLONOSCOPY  07/26/2028    • HEPATITIS C SCREENING  Completed   • Pneumococcal Vaccine Once at 65 Years Old  Completed       Depression Screen:   PHQ-2/PHQ-9 Depression Screening 4/21/2020   Little interest or pleasure in doing things 0   Feeling down, depressed, or hopeless 0   Total Score 0       Functional and Cognitive Screening:  Functional & Cognitive Status 4/21/2020   Do you have difficulty preparing food and eating? No   Do you have difficulty bathing yourself, getting dressed or grooming yourself? No   Do you have difficulty using the toilet? No   Do you have difficulty moving around from place to place? No   Do you have trouble with steps or getting out of a bed or a chair? No   Current Diet Well Balanced Diet   Dental Exam Up to date   Eye Exam Up to date   Exercise (times per week) 3 times per week   Current Exercise Activities Include Walking   Do you need help using the phone?  No   Are you deaf or do you have serious difficulty hearing?  No   Do you need help with transportation? No   Do you need help shopping? No   Do you need help preparing meals?  No   Do you need help with housework?  No   Do you need help with laundry? No   Do you need help taking your medications? No   Do you need help managing money? No   Do you ever drive or ride in a car without wearing a seat belt? No   Have you felt unusual stress, anger or loneliness in the last month? -   Who do you live with? -   If you need help, do you have trouble finding someone available to you? -   Have you been bothered in the last four weeks by sexual problems? -   Do you have difficulty concentrating, remembering or making decisions? -     Does the patient have evidence of cognitive impairment? none    Compared to one year ago, the patient feels their physical health and mental health are the same.       Review of Systems   Constitutional: Negative.    HENT: Positive for hearing loss.    Eyes: Positive for visual disturbance.   Respiratory: Negative.    Cardiovascular:  Negative.    Gastrointestinal: Negative.    Endocrine: Negative.    Genitourinary: Negative.    Musculoskeletal: Positive for arthralgias.   Skin: Negative.    Allergic/Immunologic: Negative.    Neurological: Negative.    Hematological: Negative.    Psychiatric/Behavioral: Negative.        Objective     There were no vitals filed for this visit.    There is no height or weight on file to calculate BMI.    PHYSICAL EXAM  Vitals reviewed and on chart.  HEENT: PERRLA, EOMI. Oral mucosa moist.  CV: No rate disturbance noted  LUNGS: Normal breathing effort  EXT: No edema, FROM in bilateral upper and lower ext  NEURO: CN II - XII grossly intact  PSYCH: good mood, positive affect, alert and engaged. No thoughts of self harm expressed.    ASSESSMENT AND PLAN      Problem List Items Addressed This Visit        Cardiovascular and Mediastinum    Mixed hyperlipidemia  Well managed with diet and exercise.    Relevant Orders    Comprehensive Metabolic Panel    Lipid Panel With / Chol / HDL Ratio    Purpura senilis (CMS/HCC)       Endocrine    Hypothyroidism  Well-controlled on current medication.  She struggles to get [facility] Thyroid filled because of cost but she is working on program so that she can get refills of this medication at a reasonable cost.      Relevant Orders    TSH       Other    Anxiety  She is well managed on current meds and reports no signs or symptoms of self harm, suicidal ideation. This medication helps with daily life and relationships. Will refill as needed and advised 6 month follow up.        Other Visit Diagnoses     Medicare annual wellness visit, subsequent    -  Primary    Relevant Orders    Comprehensive Metabolic Panel      She will return to the office for fasting labs.  Order placed.    Orders:  Orders Placed This Encounter   Procedures   • Comprehensive Metabolic Panel   • Lipid Panel With / Chol / HDL Ratio   • TSH     I spent over 20  minutes in face to face consultation with this patient  regarding the above documented concerns and problems in addition to the requirements of a Medicare Well exam.  .   Follow Up:  No follow-ups on file.     ADVANCED DIRECTIVES:   ACP discussion was held with the patient during this visit. Patient has an advance directive (not in EMR), copy requested.    An After Visit Summary and PPPS with all of these plans were given to the patient.

## 2020-04-22 PROBLEM — F41.9 ANXIETY: Status: ACTIVE | Noted: 2020-04-22

## 2020-04-22 NOTE — PATIENT INSTRUCTIONS
Medicare Wellness  Personal Prevention Plan of Service     Date of Office Visit:  2020  Encounter Provider:  Jorge Vázquez MD  Place of Service:  [facility]  Patient Name: Rita Hendrix  :  1945    As part of the Medicare Wellness portion of your visit today, we are providing you with this personalized preventive plan of services (PPPS). This plan is based upon recommendations of the [place] Preventive Services Task Force (USPSTF) and the Advisory Committee on Immunization Practices (ACIP).    This lists the preventive care services that should be considered, and provides dates of when you are due. Items listed as completed are up-to-date and do not require any further intervention.    Health Maintenance   Topic Date Due   • ZOSTER VACCINE (2 of 3) 2015   • LIPID PANEL  2020   • INFLUENZA VACCINE  2020   • MEDICARE ANNUAL WELLNESS  2021   • MAMMOGRAM  2021   • TDAP/TD VACCINES (2 - Td) 02/10/2022   • COLONOSCOPY  2028   • HEPATITIS C SCREENING  Completed   • Pneumococcal Vaccine Once at 65 Years Old  Completed       Orders Placed This Encounter   Procedures   • Comprehensive Metabolic Panel   • Lipid Panel With / Chol / HDL Ratio   • TSH       Return in about 6 months (around 10/21/2020) for Recheck.

## 2020-05-04 ENCOUNTER — TELEPHONE (OUTPATIENT)
Dept: FAMILY MEDICINE CLINIC | Facility: CLINIC | Age: 75
End: 2020-05-04

## 2020-05-04 NOTE — TELEPHONE ENCOUNTER
PATIENT CALLED IN AND STATED  WE NEED TO ORDER [employer] TO ORDER WITH THEM .  MED THEY MAKE IS thyroid ([place] Thyroid) 15 MG tablet . PATIENT INSURANCE WILL NOT COVER IT . PATIENT CALL BACK [phone].

## 2020-05-05 RX ORDER — LEVOTHYROXINE AND LIOTHYRONINE 9.5; 2.25 UG/1; UG/1
15 TABLET ORAL 3 TIMES DAILY
Qty: 90 TABLET | Refills: 1 | Status: SHIPPED | OUTPATIENT
Start: 2020-05-05 | End: 2020-06-24 | Stop reason: SDUPTHER

## 2020-05-27 ENCOUNTER — TELEPHONE (OUTPATIENT)
Dept: FAMILY MEDICINE CLINIC | Facility: CLINIC | Age: 75
End: 2020-05-27

## 2020-05-27 NOTE — TELEPHONE ENCOUNTER
PATIENT REQUESTED TO GET A CALL ABOUT HER MEDICINE thyroid ([place] Thyroid) 15 MG tablet, IF THE MEDICINE WOULD BE REFILL FOR 3 MONTHS. PLEASE ADVISE

## 2020-06-24 RX ORDER — LEVOTHYROXINE AND LIOTHYRONINE 9.5; 2.25 UG/1; UG/1
15 TABLET ORAL 3 TIMES DAILY
Qty: 30 TABLET | Refills: 0 | Status: SHIPPED | OUTPATIENT
Start: 2020-06-24 | End: 2020-07-06 | Stop reason: SDUPTHER

## 2020-06-24 NOTE — TELEPHONE ENCOUNTER
PATIENT CALLED IN REFERENCE TO HER NP [facility]. SHE STATES SHE IS TO PICK IT UP FROM THE OFFICE AND WANTS TO VERIFY IT IS IN.    thyroid ([place] Thyroid) 15 MG tablet    SHE WILL TAKE HER LAST TABLET TOMORROW  PLEASE CALL [phone] TO ADVISE  SHE WOULD LIKE TO COME AND  AROUND 11:00- 11:30

## 2020-07-06 ENCOUNTER — TELEPHONE (OUTPATIENT)
Dept: FAMILY MEDICINE CLINIC | Facility: CLINIC | Age: 75
End: 2020-07-06

## 2020-07-06 RX ORDER — LEVOTHYROXINE AND LIOTHYRONINE 9.5; 2.25 UG/1; UG/1
15 TABLET ORAL 3 TIMES DAILY
Qty: 30 TABLET | Refills: 0 | Status: SHIPPED | OUTPATIENT
Start: 2020-07-06 | End: 2020-07-15 | Stop reason: SDUPTHER

## 2020-07-06 NOTE — TELEPHONE ENCOUNTER
PATIENT CALLED TO FIND OUT IF HER THYROID MEDICATION IS AT THE OFFICE YET. SHE WOULD LIKE 10 MORE DAYS OF RX, thyroid ([place] Thyroid) 15 MG tablet. PLEASE ADVISE.    [facility] CONFIRMED    PATIENT CALLBACK # [phone]

## 2020-07-14 DIAGNOSIS — F41.9 ANXIETY: ICD-10-CM

## 2020-07-14 RX ORDER — VENLAFAXINE HYDROCHLORIDE 75 MG/1
CAPSULE, EXTENDED RELEASE ORAL
Qty: 90 CAPSULE | Refills: 0 | Status: SHIPPED | OUTPATIENT
Start: 2020-07-14 | End: 2020-10-12

## 2020-07-15 ENCOUNTER — TELEPHONE (OUTPATIENT)
Dept: FAMILY MEDICINE CLINIC | Facility: CLINIC | Age: 75
End: 2020-07-15

## 2020-07-15 DIAGNOSIS — E03.8 OTHER SPECIFIED HYPOTHYROIDISM: Primary | ICD-10-CM

## 2020-07-15 RX ORDER — LEVOTHYROXINE AND LIOTHYRONINE 9.5; 2.25 UG/1; UG/1
15 TABLET ORAL 3 TIMES DAILY
Qty: 30 TABLET | Refills: 0 | Status: SHIPPED | OUTPATIENT
Start: 2020-07-15 | End: 2020-07-15 | Stop reason: SDUPTHER

## 2020-07-15 RX ORDER — LEVOTHYROXINE AND LIOTHYRONINE 9.5; 2.25 UG/1; UG/1
15 TABLET ORAL 3 TIMES DAILY
Qty: 90 TABLET | Refills: 0 | Status: SHIPPED | OUTPATIENT
Start: 2020-07-15 | End: 2020-09-10 | Stop reason: SDUPTHER

## 2020-07-15 NOTE — TELEPHONE ENCOUNTER
PATIENT CALLED TO SEE IF YOU HAVE ARMOR THYROID FOR HER IN THE OFFICE. IF NOT SHE WOULD LIKE PRESCRIPTION SENT IN TO ...  Caller: Rita Hendrix    Relationship: Self    Best call back number:[phone]     Medication needed:   Requested Prescriptions     Pending Prescriptions Disp Refills   • thyroid ([place] Thyroid) 15 MG tablet 30 tablet 0     Sig: Take 1 tablet by mouth 3 (Three) Times a Day.       When do you need the refill by: ASAP  What details did the patient provide when requesting the medication: USUALLY GETS IN OFFICE BUT IF NOT, PLEASE SEND REFILL  Does the patient have less than a 3 day supply:  [] Yes  [x] No    What is the patient's preferred pharmacy: [facility], [facility] [address] [place] [address] [phone] [facility] [phone] FX

## 2020-08-14 ENCOUNTER — TELEPHONE (OUTPATIENT)
Dept: FAMILY MEDICINE CLINIC | Facility: CLINIC | Age: 75
End: 2020-08-14

## 2020-08-14 NOTE — TELEPHONE ENCOUNTER
PATIENT CALLED REQUESTING TO SPEAK WITH JOSIAH.    THE PATIENT STATED THAT SHE WOULD LIKE FOR JOSIAH TO GO AHEAD AND SEND A FAX HER PRESCRIPTION FOR THE THYROID ([facility]OUR THYROID) 15 MG TABLET TO [facility] SO THAT THEY CAN GET IT PREPARED AND SEND IT TO DR. GONZALEZ'S OFFICE FOR HER. THE PATIENT STATED THAT IT HAS BEEN TAKING THEM ABOUT 2 MONTHS TO GET HER PRESCRIPTION READY AND SENT TO THE OFFICE, SO SHE WANTS TO GET A HEAD START ON IT THIS TIME.    THE PATIENT STATED THAT SHE TAKES 3 TABLETS A DAY (90 TABLETS IN A 1-MONTH PRESCRIPTION), AND SHE CURRENTLY HAS ABOUT A MONTH'S SUPPLY LEFT OF THIS MEDICATION.      THE PATIENT STATED THAT THE PRESCRIPTION CAN BE FAXED TO [facility] AT [phone].    IF THERE ARE ANY QUESTIONS OR CONCERNS PLEASE CALL THE PATIENT [phone].

## 2020-09-08 DIAGNOSIS — E03.8 OTHER SPECIFIED HYPOTHYROIDISM: ICD-10-CM

## 2020-09-08 RX ORDER — THYROID,PORK 15 MG
TABLET ORAL
Qty: 90 TABLET | Refills: 0 | OUTPATIENT
Start: 2020-09-08

## 2020-09-10 ENCOUNTER — TELEPHONE (OUTPATIENT)
Dept: FAMILY MEDICINE CLINIC | Facility: CLINIC | Age: 75
End: 2020-09-10

## 2020-09-10 DIAGNOSIS — E03.8 OTHER SPECIFIED HYPOTHYROIDISM: ICD-10-CM

## 2020-09-10 RX ORDER — LEVOTHYROXINE AND LIOTHYRONINE 9.5; 2.25 UG/1; UG/1
15 TABLET ORAL 3 TIMES DAILY
Qty: 90 TABLET | Refills: 0 | Status: SHIPPED | OUTPATIENT
Start: 2020-09-10 | End: 2020-10-12

## 2020-09-10 NOTE — TELEPHONE ENCOUNTER
Caller: Rita Hendrix    Relationship: Self    Best call back number: [phone]    Medication needed:   thyroid ([place] Thyroid) 15 MG tablet    What details did the patient provide when requesting the medication: PATIENT STATES SHE SOMETIMES GETS FREE MEDICATION THROUGH THE OFFICE. PATIENT STATES SHE WILL NEED THEM SENT TO ON FILE BELOW.   PATIENT STATES SHE ONLY HAS 3 DAYS LEFT AND HER INSURANCE DOES NOT COVER THEM.    PLEASE CALL AND ADVISE PATIENT    Does the patient have less than a 3 day supply:  [x] Yes  [] No    What is the patient's preferred pharmacy:      [facility] [place] -[phone] [facility] [place] - [address] [place] [address] [phone] [place] [phone] [phone]

## 2020-09-10 NOTE — TELEPHONE ENCOUNTER
Script faxed for patient and script was also sent to patients pharmacy on file left message for patient

## 2020-09-17 ENCOUNTER — TELEPHONE (OUTPATIENT)
Dept: FAMILY MEDICINE CLINIC | Facility: CLINIC | Age: 75
End: 2020-09-17

## 2020-09-17 NOTE — TELEPHONE ENCOUNTER
PATIENT STATES THAT [facility] NEEDS TO KNOW WHAT KIND OF PNEUMONIA SHOT SHE GOT LAST YEAR SO SHE CAN GET THE SAME ONE THIS YEAR. CAN YOU PLEASE CALL [facility] [phone] TO LET THEM KNOW .     PLEASE ADVISE  [phone]

## 2020-09-18 NOTE — TELEPHONE ENCOUNTER
Patient had the pneumonia 23 in 2010 and the 13 in 2015   I know she does not get them yearly, but does she need to get 1 more now? Please advise

## 2020-10-09 DIAGNOSIS — E03.8 OTHER SPECIFIED HYPOTHYROIDISM: ICD-10-CM

## 2020-10-09 NOTE — TELEPHONE ENCOUNTER
Please call her - we need to get her labs drawn. I will fill for one month after she makes her appointment.

## 2020-10-12 DIAGNOSIS — F41.9 ANXIETY: ICD-10-CM

## 2020-10-12 DIAGNOSIS — E03.8 OTHER SPECIFIED HYPOTHYROIDISM: ICD-10-CM

## 2020-10-12 RX ORDER — THYROID,PORK 15 MG
TABLET ORAL
Qty: 90 TABLET | Refills: 0 | Status: SHIPPED | OUTPATIENT
Start: 2020-10-12 | End: 2020-11-24 | Stop reason: SDUPTHER

## 2020-10-12 RX ORDER — VENLAFAXINE HYDROCHLORIDE 75 MG/1
CAPSULE, EXTENDED RELEASE ORAL
Qty: 90 CAPSULE | Refills: 0 | Status: SHIPPED | OUTPATIENT
Start: 2020-10-12 | End: 2021-01-20

## 2020-10-14 RX ORDER — LEVOTHYROXINE AND LIOTHYRONINE 9.5; 2.25 UG/1; UG/1
15 TABLET ORAL 3 TIMES DAILY
Qty: 90 TABLET | Refills: 0 | Status: SHIPPED | OUTPATIENT
Start: 2020-10-14 | End: 2021-06-02 | Stop reason: SDUPTHER

## 2020-10-21 LAB
ALBUMIN SERPL-MCNC: 4.5 G/DL (ref 3.5–5.2)
ALBUMIN/GLOB SERPL: 2.8 G/DL
ALP SERPL-CCNC: 70 U/L (ref 39–117)
ALT SERPL-CCNC: 11 U/L (ref 1–33)
AST SERPL-CCNC: 15 U/L (ref 1–32)
BILIRUB SERPL-MCNC: 0.3 MG/DL (ref 0–1.2)
BUN SERPL-MCNC: 10 MG/DL (ref 8–23)
BUN/CREAT SERPL: 12.2 (ref 7–25)
CALCIUM SERPL-MCNC: 9.5 MG/DL (ref 8.6–10.5)
CHLORIDE SERPL-SCNC: 102 MMOL/L (ref 98–107)
CHOLEST SERPL-MCNC: 212 MG/DL (ref 0–200)
CHOLEST/HDLC SERPL: 2.72 {RATIO}
CO2 SERPL-SCNC: 29.9 MMOL/L (ref 22–29)
CREAT SERPL-MCNC: 0.82 MG/DL (ref 0.57–1)
GLOBULIN SER CALC-MCNC: 1.6 GM/DL
GLUCOSE SERPL-MCNC: 74 MG/DL (ref 65–99)
HDLC SERPL-MCNC: 78 MG/DL (ref 40–60)
LDLC SERPL CALC-MCNC: 113 MG/DL (ref 0–100)
POTASSIUM SERPL-SCNC: 4.4 MMOL/L (ref 3.5–5.2)
PROT SERPL-MCNC: 6.1 G/DL (ref 6–8.5)
SODIUM SERPL-SCNC: 140 MMOL/L (ref 136–145)
TRIGL SERPL-MCNC: 124 MG/DL (ref 0–150)
TSH SERPL DL<=0.005 MIU/L-ACNC: 0.93 UIU/ML (ref 0.27–4.2)
VLDLC SERPL CALC-MCNC: 21 MG/DL (ref 5–40)

## 2020-11-24 DIAGNOSIS — E03.8 OTHER SPECIFIED HYPOTHYROIDISM: ICD-10-CM

## 2020-11-24 RX ORDER — LEVOTHYROXINE AND LIOTHYRONINE 9.5; 2.25 UG/1; UG/1
15 TABLET ORAL 3 TIMES DAILY
Qty: 270 TABLET | Refills: 3 | Status: SHIPPED | OUTPATIENT
Start: 2020-11-24 | End: 2021-06-02 | Stop reason: SDUPTHER

## 2020-12-28 DIAGNOSIS — L29.9 ITCHING: ICD-10-CM

## 2020-12-29 RX ORDER — HYDROXYZINE HYDROCHLORIDE 25 MG/1
TABLET, FILM COATED ORAL
Qty: 90 TABLET | Refills: 0 | Status: SHIPPED | OUTPATIENT
Start: 2020-12-29 | End: 2021-06-02 | Stop reason: SDUPTHER

## 2021-01-20 DIAGNOSIS — F41.9 ANXIETY: ICD-10-CM

## 2021-01-20 RX ORDER — VENLAFAXINE HYDROCHLORIDE 75 MG/1
CAPSULE, EXTENDED RELEASE ORAL
Qty: 90 CAPSULE | Refills: 0 | Status: SHIPPED | OUTPATIENT
Start: 2021-01-20 | End: 2021-04-13

## 2021-04-13 DIAGNOSIS — F41.9 ANXIETY: ICD-10-CM

## 2021-04-13 RX ORDER — VENLAFAXINE HYDROCHLORIDE 75 MG/1
CAPSULE, EXTENDED RELEASE ORAL
Qty: 30 CAPSULE | Refills: 0 | Status: SHIPPED | OUTPATIENT
Start: 2021-04-13 | End: 2021-04-23 | Stop reason: SDUPTHER

## 2021-04-22 ENCOUNTER — TELEPHONE (OUTPATIENT)
Dept: FAMILY MEDICINE CLINIC | Facility: CLINIC | Age: 76
End: 2021-04-22

## 2021-04-22 DIAGNOSIS — F41.9 ANXIETY: ICD-10-CM

## 2021-04-22 RX ORDER — VENLAFAXINE HYDROCHLORIDE 75 MG/1
75 CAPSULE, EXTENDED RELEASE ORAL DAILY
Qty: 30 CAPSULE | Refills: 0 | Status: CANCELLED | OUTPATIENT
Start: 2021-04-22

## 2021-04-22 NOTE — TELEPHONE ENCOUNTER
Caller: Rita Hendrix    Relationship: Self    Best call back number: [phone]    Medication needed:   Requested Prescriptions     Pending Prescriptions Disp Refills   • venlafaxine XR (EFFEXOR-XR) 75 MG 24 hr capsule 30 capsule 0     Sig: Take 1 capsule by mouth Daily.       When do you need the refill by: 04/22/2021    What additional details did the patient provide when requesting the medication: PATIENT WOULD LIKE A 3 MONTH SUPPLY OF THIS MEDICATION, IF POSSIBLE. SHE STATES THAT THE PHARMACY HAS PUT IN MULTIPLE REQUESTS TO THE OFFICE WITH NO REPLY AS OF YET. PLEASE ADVISE.    Does the patient have less than a 3 day supply:  [x] Yes  [] No    What is the patient's preferred pharmacy: [facility] - [place], [place] -[phone] [facility] [place] - [address] [facility] [phone] [place] [phone] FX

## 2021-04-23 DIAGNOSIS — F41.9 ANXIETY: ICD-10-CM

## 2021-04-23 RX ORDER — VENLAFAXINE HYDROCHLORIDE 75 MG/1
75 CAPSULE, EXTENDED RELEASE ORAL DAILY
Qty: 90 CAPSULE | Refills: 0 | Status: SHIPPED | OUTPATIENT
Start: 2021-04-23 | End: 2021-08-26 | Stop reason: SDUPTHER

## 2021-06-02 ENCOUNTER — OFFICE VISIT (OUTPATIENT)
Dept: FAMILY MEDICINE CLINIC | Facility: CLINIC | Age: 76
End: 2021-06-02

## 2021-06-02 VITALS
RESPIRATION RATE: 16 BRPM | WEIGHT: 139 LBS | HEIGHT: 67 IN | OXYGEN SATURATION: 98 % | SYSTOLIC BLOOD PRESSURE: 122 MMHG | BODY MASS INDEX: 21.82 KG/M2 | DIASTOLIC BLOOD PRESSURE: 70 MMHG | HEART RATE: 88 BPM

## 2021-06-02 DIAGNOSIS — Z78.0 POSTMENOPAUSAL: Primary | ICD-10-CM

## 2021-06-02 DIAGNOSIS — Z12.31 ENCOUNTER FOR SCREENING MAMMOGRAM FOR MALIGNANT NEOPLASM OF BREAST: ICD-10-CM

## 2021-06-02 DIAGNOSIS — L29.9 ITCHING: ICD-10-CM

## 2021-06-02 DIAGNOSIS — Z00.00 MEDICARE ANNUAL WELLNESS VISIT, SUBSEQUENT: ICD-10-CM

## 2021-06-02 DIAGNOSIS — E78.2 MIXED HYPERLIPIDEMIA: ICD-10-CM

## 2021-06-02 DIAGNOSIS — E03.8 OTHER SPECIFIED HYPOTHYROIDISM: ICD-10-CM

## 2021-06-02 DIAGNOSIS — F41.9 ANXIETY: ICD-10-CM

## 2021-06-02 PROCEDURE — 99214 OFFICE O/P EST MOD 30 MIN: CPT | Performed by: FAMILY MEDICINE

## 2021-06-02 PROCEDURE — G0439 PPPS, SUBSEQ VISIT: HCPCS | Performed by: FAMILY MEDICINE

## 2021-06-02 RX ORDER — HYDROXYZINE HYDROCHLORIDE 25 MG/1
25 TABLET, FILM COATED ORAL EVERY 8 HOURS PRN
Qty: 90 TABLET | Refills: 2 | Status: SHIPPED | OUTPATIENT
Start: 2021-06-02 | End: 2023-01-11 | Stop reason: SDUPTHER

## 2021-06-02 RX ORDER — LEVOTHYROXINE AND LIOTHYRONINE 9.5; 2.25 UG/1; UG/1
15 TABLET ORAL 3 TIMES DAILY
Qty: 270 TABLET | Refills: 3 | Status: SHIPPED | OUTPATIENT
Start: 2021-06-02 | End: 2021-08-26 | Stop reason: SDUPTHER

## 2021-06-02 RX ORDER — LEVOTHYROXINE AND LIOTHYRONINE 9.5; 2.25 UG/1; UG/1
15 TABLET ORAL 3 TIMES DAILY
Qty: 90 TABLET | Refills: 0 | Status: SHIPPED | OUTPATIENT
Start: 2021-06-02 | End: 2021-10-12

## 2021-06-02 NOTE — PATIENT INSTRUCTIONS
Medicare Wellness  Personal Prevention Plan of Service     Date of Office Visit:  2021  Encounter Provider:  Jorge Vázquez MD  Place of Service:  [facility]  Patient Name: Rita Hendrix  :  1945    As part of the Medicare Wellness portion of your visit today, we are providing you with this personalized preventive plan of services (PPPS). This plan is based upon recommendations of the [place] Preventive Services Task Force (USPSTF) and the Advisory Committee on Immunization Practices (ACIP).    This lists the preventive care services that should be considered, and provides dates of when you are due. Items listed as completed are up-to-date and do not require any further intervention.    Health Maintenance   Topic Date Due   • DXA SCAN  Never done   • INFLUENZA VACCINE  2021   • MAMMOGRAM  2021   • LIPID PANEL  10/20/2021   • TDAP/TD VACCINES (2 - Td or Tdap) 02/10/2022   • ANNUAL WELLNESS VISIT  2022   • COLORECTAL CANCER SCREENING  2028   • HEPATITIS C SCREENING  Completed   • COVID-19 Vaccine  Completed   • Pneumococcal Vaccine 65+  Completed   • ZOSTER VACCINE  Completed       Orders Placed This Encounter   Procedures   • DEXA Bone Density Axial     Standing Status:   Future     Standing Expiration Date:   6/3/2022   • Mammo Screening Bilateral With CAD     Standing Status:   Future   • CBC (No Diff)     Order Specific Question:   Release to patient     Answer:   Immediate   • Comprehensive Metabolic Panel     Order Specific Question:   Release to patient     Answer:   Immediate   • TSH     Order Specific Question:   Release to patient     Answer:   Immediate       Return in about 6 months (around 2021) for Recheck.

## 2021-06-02 NOTE — PROGRESS NOTES
Medicare Subsequent Wellness Visit  Subjective   History of Present Illness    Rita Hendrix is a 75 y.o. female who presents for an Medicare Wellness Visit. In addition, we addressed the following health issues:    Rita has a history of chronic hypothyroidism and has been well controlled on current dose of replacement.She report no concerning symptoms: no cold intolerance, fatigue or hair loss. She is taking [place] Thyroid.      Rita has chronic anxiety and has been taking-Effexor XR. Initially took this for hot flashes but found it reduced social anxiety.   She has a hx of chronic anxiety and is doing well on current medication. She reports no concerning side effects and feels this medication helps with challenges of daily life.        She has a hx of migraine headaches. This is a chronic problem and responds well to treatment. Takes Imitrex approx 1x month.      Hyperlipidemia. Maternal Grandfather and 2 Aunts had heart issues/high cholestrol. Has not tried a statin in the past.      She has chronic urticaria and has a long hx of a skin rash that is usually related to allergies  and she is taking  Atarax to help with this. It mostly helps with sleep.     Diet:She eats a healthy diet full of vegetables, grains, fiber, and limits meat.  Exercise: Swims or walks 30-45 min, does this 2-3 weeks.        PMH, PSH, SocHx, FamHx, Allergies, and Medications: Reviewed and updated in the history section of chart.  Family History   Problem Relation Age of Onset   • Stroke Mother    • Parkinsonism Mother    • Cancer Father         Pancreatic       Social History     Social History Narrative    Lives at home with        Allergies   Allergen Reactions   • Penicillins Hives   • Sulfa Antibiotics Itching       Outpatient Medications Prior to Visit   Medication Sig Dispense Refill   • magnesium oxide (MAGOX) 400 (241.3 Mg) MG tablet tablet Take 400 mg by mouth Daily.     • SUMAtriptan (IMITREX) 50 MG tablet Take one  tablet at onset of headache. May repeat dose one time in 2 hours if headache not relieved. 27 tablet 3   • triamcinolone (KENALOG) 0.1 % cream Apply  topically to the appropriate area as directed 2 (Two) Times a Day. 453.6 g 0   • venlafaxine XR (EFFEXOR-XR) 75 MG 24 hr capsule Take 1 capsule by mouth Daily. 90 capsule 0   • hydrOXYzine (ATARAX) 25 MG tablet TAKE ONE TABLET BY MOUTH EVERY 8 HOURS AS NEEDED FOR ITCHING 90 tablet 0   • thyroid ([place] Thyroid) 15 MG tablet Take 1 tablet by mouth 3 (Three) Times a Day. 90 tablet 0   • thyroid ([place] Thyroid) 15 MG tablet Take 1 tablet by mouth 3 (Three) Times a Day. 270 tablet 3   • Probiotic Product (PROBIOTIC ADVANCED PO) Take  by mouth.       No facility-administered medications prior to visit.        Patient Active Problem List   Diagnosis   • Hypothyroidism   • Mixed hyperlipidemia   • Purpura senilis (CMS/HCC)   • Anxiety   • Itching         Patient Care Team:  Jorge Vázquez MD as PCP - General  Martha Gardiner MA as Medical Assistant (Family Medicine)  Berkley Turk MA as Medical Assistant  Health Habits:  Current Diet: Well balanced diet  Tobacco use.  Pack years: 15, quit [id number]  Dental Exam. UTD  Eye Exam. UTD  Exercise:swims,walks    Recent Hospitalizations:  none    Age-appropriate Screening Schedule:  Refer to the list below for future screening recommendations based on patient's age. Orders for these recommended tests are listed in the plan section. The patient has been provided with a written plan.      Health Maintenance   Topic Date Due   • DXA SCAN  Never done   • INFLUENZA VACCINE  08/01/2021   • MAMMOGRAM  08/09/2021   • LIPID PANEL  10/20/2021   • TDAP/TD VACCINES (2 - Td or Tdap) 02/10/2022   • ANNUAL WELLNESS VISIT  06/02/2022   • COLORECTAL CANCER SCREENING  07/26/2028   • HEPATITIS C SCREENING  Completed   • COVID-19 Vaccine  Completed   • Pneumococcal Vaccine 65+  Completed   • ZOSTER VACCINE  Completed       Depression Screen:    PHQ-2/PHQ-9 Depression Screening 6/2/2021   Little interest or pleasure in doing things 0   Feeling down, depressed, or hopeless 0   Total Score 0       Functional and Cognitive Screening:  Functional & Cognitive Status 6/2/2021   Do you have difficulty preparing food and eating? No   Do you have difficulty bathing yourself, getting dressed or grooming yourself? No   Do you have difficulty using the toilet? No   Do you have difficulty moving around from place to place? No   Do you have trouble with steps or getting out of a bed or a chair? No   Current Diet Well Balanced Diet   Dental Exam Up to date   Eye Exam Up to date   Exercise (times per week) 3 times per week   Current Exercise Activities Include Swimming   Do you need help using the phone?  No   Are you deaf or do you have serious difficulty hearing?  No   Do you need help with transportation? No   Do you need help shopping? No   Do you need help preparing meals?  No   Do you need help with housework?  No   Do you need help with laundry? No   Do you need help taking your medications? No   Do you need help managing money? No   Do you ever drive or ride in a car without wearing a seat belt? No   Have you felt unusual stress, anger or loneliness in the last month? No   Who do you live with? Spouse   If you need help, do you have trouble finding someone available to you? No   Have you been bothered in the last four weeks by sexual problems? No   Do you have difficulty concentrating, remembering or making decisions? No     Does the patient have evidence of cognitive impairment? none    Compared to one year ago, the patient feels their physical health and mental health are the same.       Review of Systems   Constitutional: Negative.    HENT: Positive for hearing loss.    Respiratory: Negative.    Cardiovascular: Negative.    Gastrointestinal: Negative.    Endocrine: Negative.    Genitourinary: Negative.    Musculoskeletal: Positive for arthralgias.   Skin:  "Negative.    Allergic/Immunologic: Negative.    Neurological: Negative.    Hematological: Negative.    Psychiatric/Behavioral: Negative.        Objective     Vitals:    06/02/21 1415   BP: 122/70   Pulse: 88   Resp: 16   SpO2: 98%   Weight: 63 kg (139 lb)   Height: 170.2 cm (67\")       Body mass index is 21.77 kg/m².    PHYSICAL EXAM  Vitals reviewed and on chart.  HEENT: PERRLA, EOMI. Oral mucosa moist,   No LAD.  CV: RRR, no murmurs, rubs, clicks or gallops  LUNGS: CTA bilaterally  EXT: No edema, FROM in bilateral upper and lower ext  NEURO: CN II - XII grossly intact  PSYCH: good mood, positive affect, alert and engaged. No thoughts of self harm  expressed.    ASSESSMENT AND PLAN  Mammogram: Ordered  Colon cancer screening : Up-to-date  Bone Density :ordered      Problem List Items Addressed This Visit        Cardiac and Vasculature    Mixed hyperlipidemia    Overview     Declining treatment at this time.  Labs will be checked today reported to Rita.            Endocrine and Metabolic    Hypothyroidism    Overview     She is doing well on current medication.  It is her wish to stay on [place] Thyroid and she is doing well on this medication.  We will check thyroid levels every 6 months.         Relevant Medications    thyroid ([place] Thyroid) 15 MG tablet    thyroid ([place] Thyroid) 15 MG tablet    Other Relevant Orders    TSH       Mental Health    Anxiety    Overview     She is well managed on current meds and reports she is managing her anxiety well on this medication.  This medication helps with daily life and relationships. Will refill as needed and advised 6 month follow up.            Skin    Itching    Overview     This is a chronic problem and she finds that the itching is much worse at night.  She gets relief by taking Atarax 25 mg on as-needed basis at night to sleep.         Relevant Medications    hydrOXYzine (ATARAX) 25 MG tablet      Other Visit Diagnoses     Postmenopausal    -  Primary    " Relevant Orders    DEXA Bone Density Axial    Medicare annual wellness visit, subsequent        Relevant Orders    CBC (No Diff)    Comprehensive Metabolic Panel    Encounter for screening mammogram for malignant neoplasm of breast        Relevant Orders    Mammo Screening Bilateral With CAD        Labs reviewed and on the chart.  They were discussed with Rita.  Orders:  Orders Placed This Encounter   Procedures   • DEXA Bone Density Axial   • Mammo Screening Bilateral With CAD   • CBC (No Diff)   • Comprehensive Metabolic Panel   • TSH       Follow Up:  Return in about 6 months (around 12/2/2021) for Recheck.     ADVANCED DIRECTIVES:   ACP discussion was held with the patient during this visit. Patient has an advance directive (not in EMR), copy requested.    An After Visit Summary and PPPS with all of these plans were given to the patient.

## 2021-06-03 LAB
ALBUMIN SERPL-MCNC: 4.5 G/DL (ref 3.7–4.7)
ALBUMIN/GLOB SERPL: 2.1 {RATIO} (ref 1.2–2.2)
ALP SERPL-CCNC: 73 IU/L (ref 48–121)
ALT SERPL-CCNC: 12 IU/L (ref 0–32)
AST SERPL-CCNC: 17 IU/L (ref 0–40)
BILIRUB SERPL-MCNC: 0.5 MG/DL (ref 0–1.2)
BUN SERPL-MCNC: 13 MG/DL (ref 8–27)
BUN/CREAT SERPL: 18 (ref 12–28)
CALCIUM SERPL-MCNC: 9.7 MG/DL (ref 8.7–10.3)
CHLORIDE SERPL-SCNC: 102 MMOL/L (ref 96–106)
CO2 SERPL-SCNC: 25 MMOL/L (ref 20–29)
CREAT SERPL-MCNC: 0.72 MG/DL (ref 0.57–1)
ERYTHROCYTE [DISTWIDTH] IN BLOOD BY AUTOMATED COUNT: 13.1 % (ref 11.7–15.4)
GLOBULIN SER CALC-MCNC: 2.1 G/DL (ref 1.5–4.5)
GLUCOSE SERPL-MCNC: 80 MG/DL (ref 65–99)
HCT VFR BLD AUTO: 44 % (ref 34–46.6)
HGB BLD-MCNC: 15 G/DL (ref 11.1–15.9)
MCH RBC QN AUTO: 31.2 PG (ref 26.6–33)
MCHC RBC AUTO-ENTMCNC: 34.1 G/DL (ref 31.5–35.7)
MCV RBC AUTO: 92 FL (ref 79–97)
PLATELET # BLD AUTO: 365 X10E3/UL (ref 150–450)
POTASSIUM SERPL-SCNC: 4.2 MMOL/L (ref 3.5–5.2)
PROT SERPL-MCNC: 6.6 G/DL (ref 6–8.5)
RBC # BLD AUTO: 4.81 X10E6/UL (ref 3.77–5.28)
SODIUM SERPL-SCNC: 142 MMOL/L (ref 134–144)
TSH SERPL DL<=0.005 MIU/L-ACNC: 0.84 UIU/ML (ref 0.45–4.5)
WBC # BLD AUTO: 6.4 X10E3/UL (ref 3.4–10.8)

## 2021-06-07 ENCOUNTER — TRANSCRIBE ORDERS (OUTPATIENT)
Dept: ADMINISTRATIVE | Facility: HOSPITAL | Age: 76
End: 2021-06-07

## 2021-06-07 DIAGNOSIS — Z12.31 VISIT FOR SCREENING MAMMOGRAM: Primary | ICD-10-CM

## 2021-06-28 ENCOUNTER — HOSPITAL ENCOUNTER (OUTPATIENT)
Dept: BONE DENSITY | Facility: HOSPITAL | Age: 76
Discharge: HOME | End: 2021-06-28

## 2021-06-28 ENCOUNTER — HOSPITAL ENCOUNTER (OUTPATIENT)
Dept: MAMMOGRAPHY | Facility: HOSPITAL | Age: 76
Discharge: HOME | End: 2021-06-28

## 2021-06-28 DIAGNOSIS — Z12.31 VISIT FOR SCREENING MAMMOGRAM: ICD-10-CM

## 2021-06-28 DIAGNOSIS — Z78.0 POSTMENOPAUSAL: ICD-10-CM

## 2021-06-28 PROCEDURE — 77067 SCR MAMMO BI INCL CAD: CPT

## 2021-06-28 PROCEDURE — 77063 BREAST TOMOSYNTHESIS BI: CPT

## 2021-06-28 PROCEDURE — 77080 DXA BONE DENSITY AXIAL: CPT

## 2021-08-26 DIAGNOSIS — F41.9 ANXIETY: ICD-10-CM

## 2021-08-26 DIAGNOSIS — E03.8 OTHER SPECIFIED HYPOTHYROIDISM: ICD-10-CM

## 2021-08-26 RX ORDER — VENLAFAXINE HYDROCHLORIDE 75 MG/1
75 CAPSULE, EXTENDED RELEASE ORAL DAILY
Qty: 90 CAPSULE | Refills: 2 | Status: SHIPPED | OUTPATIENT
Start: 2021-08-26 | End: 2021-11-30 | Stop reason: SDUPTHER

## 2021-08-26 RX ORDER — LEVOTHYROXINE AND LIOTHYRONINE 9.5; 2.25 UG/1; UG/1
15 TABLET ORAL 3 TIMES DAILY
Qty: 270 TABLET | Refills: 3 | Status: SHIPPED | OUTPATIENT
Start: 2021-08-26 | End: 2022-04-29

## 2021-08-26 NOTE — TELEPHONE ENCOUNTER
PATIENT CALLED FOR MEDICATION REFILL OF     venlafaxine XR (EFFEXOR-XR) 75 MG 24 hr capsule  90 DAY SUPPLY  SHE HAS ABOUT A WEEKS LEFT    [facility] - [place] -[id number] [place] - [address] [facility] - [phone]  - [phone]   [phone]      thyroid ([place] Thyroid) 15 MG tablet  SHE HAS ABOUT A WEEK LEFT  WILL HAVE TO CALL  TO GET REFILL AND IS DELIVERED TO THE OFFICE.    CALL BACK NUMBER [phone]

## 2021-10-12 DIAGNOSIS — E03.8 OTHER SPECIFIED HYPOTHYROIDISM: ICD-10-CM

## 2021-10-12 RX ORDER — THYROID,PORK 15 MG
TABLET ORAL
Qty: 90 TABLET | Refills: 0 | Status: SHIPPED | OUTPATIENT
Start: 2021-10-12 | End: 2021-11-08

## 2021-11-08 ENCOUNTER — TELEPHONE (OUTPATIENT)
Dept: FAMILY MEDICINE CLINIC | Facility: CLINIC | Age: 76
End: 2021-11-08

## 2021-11-08 DIAGNOSIS — E03.8 OTHER SPECIFIED HYPOTHYROIDISM: ICD-10-CM

## 2021-11-08 RX ORDER — LEVOTHYROXINE AND LIOTHYRONINE 9.5; 2.25 UG/1; UG/1
15 TABLET ORAL 3 TIMES DAILY
Qty: 90 TABLET | Refills: 0 | Status: SHIPPED | OUTPATIENT
Start: 2021-11-08 | End: 2022-03-25 | Stop reason: SDUPTHER

## 2021-11-08 NOTE — TELEPHONE ENCOUNTER
Caller: Rita Hendrix    Relationship: Self      Medication requested (name and dosage):   [place] THYROID  Requested Prescriptions:   Requested Prescriptions      No prescriptions requested or ordered in this encounter        Pharmacy where request should be sent:     Additional details provided by patient: PT REQUEST FOR THIS RX TO BE SENT TO WHO MAKES IT, STATES THAT'S HOW SHES DONE. BUT SHE DOESN'T REMEMBER THE NAME OF THE COMPANY. SHE ALSO REQUESTS FOR A MONTH WORTH TO BE SENT TO [facility] - [place], [place] -[phone] [facility] [place] - [address] [place] [phone] [place] [phone] [phone]      Does the patient have less than a 3 day supply:  [x] Yes  [] No    Shavonne Coombs Rep   11/08/21 11:09 EST

## 2021-11-30 DIAGNOSIS — F41.9 ANXIETY: ICD-10-CM

## 2021-11-30 RX ORDER — VENLAFAXINE HYDROCHLORIDE 75 MG/1
75 CAPSULE, EXTENDED RELEASE ORAL DAILY
Qty: 90 CAPSULE | Refills: 2 | Status: SHIPPED | OUTPATIENT
Start: 2021-11-30 | End: 2022-02-22

## 2022-02-22 DIAGNOSIS — F41.9 ANXIETY: ICD-10-CM

## 2022-02-22 RX ORDER — VENLAFAXINE HYDROCHLORIDE 75 MG/1
CAPSULE, EXTENDED RELEASE ORAL
Qty: 90 CAPSULE | Refills: 0 | Status: SHIPPED | OUTPATIENT
Start: 2022-02-22 | End: 2022-05-26

## 2022-03-25 ENCOUNTER — TELEPHONE (OUTPATIENT)
Dept: FAMILY MEDICINE CLINIC | Facility: CLINIC | Age: 77
End: 2022-03-25

## 2022-03-25 DIAGNOSIS — E03.8 OTHER SPECIFIED HYPOTHYROIDISM: ICD-10-CM

## 2022-03-25 RX ORDER — LEVOTHYROXINE AND LIOTHYRONINE 9.5; 2.25 UG/1; UG/1
15 TABLET ORAL 3 TIMES DAILY
Qty: 90 TABLET | Refills: 0 | Status: SHIPPED | OUTPATIENT
Start: 2022-03-25 | End: 2022-04-29

## 2022-03-25 NOTE — TELEPHONE ENCOUNTER
Patient called needs the [place] Thyroid sent to [facility] [phone] and also is asking if we have done a patient assistance problem for the medicine in the past.

## 2022-03-25 NOTE — TELEPHONE ENCOUNTER
Patient called needs the [place] Thyroid sent to [facility] [phone] and also is asking if we have done a patient assistance problem for the medicine in the past.

## 2022-04-28 ENCOUNTER — TELEPHONE (OUTPATIENT)
Dept: FAMILY MEDICINE CLINIC | Facility: CLINIC | Age: 77
End: 2022-04-28

## 2022-04-29 DIAGNOSIS — E03.8 OTHER SPECIFIED HYPOTHYROIDISM: ICD-10-CM

## 2022-04-29 RX ORDER — LEVOTHYROXINE AND LIOTHYRONINE 9.5; 2.25 UG/1; UG/1
15 TABLET ORAL 3 TIMES DAILY
Qty: 90 TABLET | Refills: 0 | Status: SHIPPED | OUTPATIENT
Start: 2022-04-29 | End: 2022-04-29

## 2022-05-02 DIAGNOSIS — E03.8 OTHER SPECIFIED HYPOTHYROIDISM: Primary | ICD-10-CM

## 2022-05-02 RX ORDER — LEVOTHYROXINE SODIUM 0.07 MG/1
75 TABLET ORAL DAILY
Qty: 30 TABLET | Refills: 5 | Status: SHIPPED | OUTPATIENT
Start: 2022-05-02 | End: 2022-07-28 | Stop reason: SDUPTHER

## 2022-05-03 DIAGNOSIS — M81.0 AGE-RELATED OSTEOPOROSIS WITHOUT CURRENT PATHOLOGICAL FRACTURE: Primary | ICD-10-CM

## 2022-05-26 DIAGNOSIS — F41.9 ANXIETY: ICD-10-CM

## 2022-05-26 RX ORDER — VENLAFAXINE HYDROCHLORIDE 75 MG/1
CAPSULE, EXTENDED RELEASE ORAL
Qty: 90 CAPSULE | Refills: 0 | Status: SHIPPED | OUTPATIENT
Start: 2022-05-26 | End: 2022-08-29

## 2022-07-28 ENCOUNTER — OFFICE VISIT (OUTPATIENT)
Dept: FAMILY MEDICINE CLINIC | Facility: CLINIC | Age: 77
End: 2022-07-28

## 2022-07-28 DIAGNOSIS — E03.8 OTHER SPECIFIED HYPOTHYROIDISM: ICD-10-CM

## 2022-07-28 DIAGNOSIS — S69.92XA THUMB INJURY, LEFT, INITIAL ENCOUNTER: ICD-10-CM

## 2022-07-28 DIAGNOSIS — M25.532 LEFT WRIST PAIN: Primary | ICD-10-CM

## 2022-07-28 PROCEDURE — 99213 OFFICE O/P EST LOW 20 MIN: CPT | Performed by: FAMILY MEDICINE

## 2022-07-28 RX ORDER — LEVOTHYROXINE SODIUM 0.07 MG/1
75 TABLET ORAL DAILY
Qty: 30 TABLET | Refills: 5 | Status: SHIPPED | OUTPATIENT
Start: 2022-07-28 | End: 2022-07-29 | Stop reason: SDUPTHER

## 2022-07-28 NOTE — PROGRESS NOTES
Subjective   Rita Hendrix is a 76 y.o. female.   Wrist Pain    History of Present Illness   Rita is here today for a physical but we were not able to bring into the office at this time because of her recent exposure to COVID.  This is a visit in our parking lot by me in person.  She has hypothyroidism and needs a refill of her medication and states that she is doing well but we will certainly need to check her thyroid levels at her next office visit.  The bigger concern is that she has significant pain in her left wrist and is unable to flex her left thumb.  She states that she had a fall and since that time has not been able to use that arm and says it is tender and uncomfortable.  She has been taking over-the-counter medications but she is concerned that she is not progressing.  The following portions of the patient's history were reviewed and updated as appropriate: allergies, current medications, past family history, past medical history, past social history, past surgical history and problem list.    Review of Systems   Constitutional: Negative.    HENT: Negative.    Eyes: Negative.    Respiratory: Negative.    Cardiovascular: Negative.    Genitourinary: Negative.    Musculoskeletal:        Left thumb and wrist pain   Skin: Negative.    Neurological: Negative.        Objective   Physical Exam  Constitutional:       Appearance: Normal appearance.   Cardiovascular:      Rate and Rhythm: Normal rate.      Pulses: Normal pulses.   Pulmonary:      Effort: Pulmonary effort is normal.      Breath sounds: Normal breath sounds.   Musculoskeletal:      Comments: On able to flex left thumb.  Tenderness to palpation on anterior wrist.  No swelling or redness noted.  Decreased range of motion with rotation.   Neurological:      Mental Status: She is alert.           Assessment & Plan   Problem List Items Addressed This Visit        Endocrine and Metabolic    Hypothyroidism    Overview       She is now on levothyroxine.   She is taking 75 mcg a day.  Her concern right now is that she does not feel as well as she did on the [place] Thyroid.  Unfortunately, [place] Thyroid is hard to find and incredibly expensive.  She will return to the office for labs and evaluation in 2 weeks.  In the meantime I have refilled her current dose of levothyroxine.         Relevant Medications    levothyroxine (Synthroid) 75 MCG tablet      Other Visit Diagnoses     Left wrist pain    -  Primary    Relevant Orders    Ambulatory Referral to Hand Surgery    Thumb injury, left, initial encounter        Relevant Orders    Ambulatory Referral to Hand Surgery      I have referred Rita to hand specialist for evaluation of her thumb and her wrist.         Return in about 4 weeks (around 8/25/2022).

## 2022-07-29 RX ORDER — LEVOTHYROXINE SODIUM 0.07 MG/1
75 TABLET ORAL DAILY
Qty: 30 TABLET | Refills: 5 | Status: SHIPPED | OUTPATIENT
Start: 2022-07-29 | End: 2022-11-23 | Stop reason: SDUPTHER

## 2022-08-26 ENCOUNTER — OFFICE VISIT (OUTPATIENT)
Dept: FAMILY MEDICINE CLINIC | Facility: CLINIC | Age: 77
End: 2022-08-26

## 2022-08-26 VITALS
HEIGHT: 66 IN | DIASTOLIC BLOOD PRESSURE: 76 MMHG | OXYGEN SATURATION: 98 % | WEIGHT: 142 LBS | BODY MASS INDEX: 22.82 KG/M2 | SYSTOLIC BLOOD PRESSURE: 124 MMHG | HEART RATE: 96 BPM | RESPIRATION RATE: 16 BRPM

## 2022-08-26 DIAGNOSIS — F41.9 ANXIETY: ICD-10-CM

## 2022-08-26 DIAGNOSIS — L29.9 ITCHING: ICD-10-CM

## 2022-08-26 DIAGNOSIS — K13.0 LIP LESION: ICD-10-CM

## 2022-08-26 DIAGNOSIS — Z79.899 HIGH RISK MEDICATION USE: ICD-10-CM

## 2022-08-26 DIAGNOSIS — E78.2 MIXED HYPERLIPIDEMIA: ICD-10-CM

## 2022-08-26 DIAGNOSIS — Z00.00 MEDICARE ANNUAL WELLNESS VISIT, SUBSEQUENT: Primary | ICD-10-CM

## 2022-08-26 DIAGNOSIS — E03.9 HYPOTHYROIDISM, UNSPECIFIED TYPE: ICD-10-CM

## 2022-08-26 PROCEDURE — 1170F FXNL STATUS ASSESSED: CPT | Performed by: FAMILY MEDICINE

## 2022-08-26 PROCEDURE — 1160F RVW MEDS BY RX/DR IN RCRD: CPT | Performed by: FAMILY MEDICINE

## 2022-08-26 PROCEDURE — 99213 OFFICE O/P EST LOW 20 MIN: CPT | Performed by: FAMILY MEDICINE

## 2022-08-26 PROCEDURE — G0439 PPPS, SUBSEQ VISIT: HCPCS | Performed by: FAMILY MEDICINE

## 2022-08-26 NOTE — PATIENT INSTRUCTIONS
Medicare Wellness  Personal Prevention Plan of Service     Date of Office Visit:    Encounter Provider:  Jorge Vázquez MD  Place of Service:  [facility] PRIMARY [facility]  Patient Name: Rita Hendrix  :  1945    As part of the Medicare Wellness portion of your visit today, we are providing you with this personalized preventive plan of services (PPPS). This plan is based upon recommendations of the [place] Preventive Services Task Force (USPSTF) and the Advisory Committee on Immunization Practices (ACIP).    This lists the preventive care services that should be considered, and provides dates of when you are due. Items listed as completed are up-to-date and do not require any further intervention.    Health Maintenance   Topic Date Due    LIPID PANEL  10/20/2021    TDAP/TD VACCINES (2 - Td or Tdap) 2023 (Originally 2/10/2022)    INFLUENZA VACCINE  10/01/2022    MAMMOGRAM  2023    DXA SCAN  2023    ANNUAL WELLNESS VISIT  2023    COLORECTAL CANCER SCREENING  2028    HEPATITIS C SCREENING  Completed    COVID-19 Vaccine  Completed    Pneumococcal Vaccine 65+  Completed    ZOSTER VACCINE  Completed       Orders Placed This Encounter   Procedures    Lipid Panel With LDL / HDL Ratio     Order Specific Question:   Release to patient     Answer:   Routine Release    Comprehensive Metabolic Panel     Order Specific Question:   Release to patient     Answer:   Routine Release    CBC (No Diff)     Order Specific Question:   Release to patient     Answer:   Routine Release    TSH     Order Specific Question:   Release to patient     Answer:   Routine Release    Ambulatory Referral to Dermatology     Referral Priority:   Routine     Referral Type:   Consultation     Referral Reason:   Specialty Services Required     Referred to Provider:   Mireya Gomez MD     Requested Specialty:   Dermatology     Number of Visits Requested:   1       Return in about 6 months  (around 2/26/2023) for Recheck.

## 2022-08-26 NOTE — PROGRESS NOTES
Medicare Subsequent Wellness Visit  Subjective   History of Present Illness    Rita Hendrix is a 77 y.o. female who presents for an Medicare Wellness Visit. In addition, we addressed the following health issues:    She has chronic anxiety and has been on Effexor for a long time.  She feels that she is doing much better these days and she would like to consider tapering off the medication.  She is asking for me to give her a plan and she will consider.    She also notes that she has a new lesion on the left side of her lip.  It is small but seems to be recurring.  Is been present now for several months.  She would like to get this evaluated.    She has chronic hypothyroidism and due to cost she has been willing to switch from [place] Thyroid to levothyroxine.  This is managed by me.  She is feeling a little more fatigued than she would like.  When she first started taking levothyroxine she was really tired but now she feels like she is getting better and she is looking forward to seeing if her levels are within normal limits on today's labs.    At her last visit she presented with left wrist pain and a thumb injury on her left hand.  She was referred to hand surgery and diagnosed with an acute fracture of the left distal radius she has been wearing a splint and thinks that she is feeling better and she will have follow-up with her hand surgeon soon  She has chronic migraine headaches. This is a chronic problem and responds well to treatment. Takes Imitrex approx 1x month.  I manage her treatment.     She has chronic hyperlipidemia. Maternal Grandfather and 2 Aunts had heart issues/high cholestrol. Has not tried a statin in the past.      She has chronic urticaria and has a long hx of a skin rash that is usually related to allergies  and she is taking  Atarax to help with this.  I help her manage this.  It mostly helps with sleep.     Diet:She eats a healthy diet full of vegetables, grains, fiber, and limits  meat.  Exercise: Swims or walks 30-45 min, does this 2-3 weeks.    PMH, PSH, SocHx, FamHx, Allergies, and Medications: Reviewed and updated in the history section of chart.  Family History   Problem Relation Age of Onset   • Stroke Mother    • Parkinsonism Mother    • Cancer Father         Pancreatic       Social History     Social History Narrative    Lives at home with        Allergies   Allergen Reactions   • Penicillins Hives   • Sulfa Antibiotics Itching       Outpatient Medications Prior to Visit   Medication Sig Dispense Refill   • levothyroxine (Synthroid) 75 MCG tablet Take 1 tablet by mouth Daily. 30 tablet 5   • magnesium oxide (MAGOX) 400 (241.3 Mg) MG tablet tablet Take 400 mg by mouth Daily.     • venlafaxine XR (EFFEXOR-XR) 75 MG 24 hr capsule TAKE ONE CAPSULE BY MOUTH DAILY 90 capsule 0   • hydrOXYzine (ATARAX) 25 MG tablet Take 1 tablet by mouth Every 8 (Eight) Hours As Needed for Itching. 90 tablet 2   • SUMAtriptan (IMITREX) 50 MG tablet Take one tablet at onset of headache. May repeat dose one time in 2 hours if headache not relieved. 27 tablet 3   • triamcinolone (KENALOG) 0.1 % cream Apply  topically to the appropriate area as directed 2 (Two) Times a Day. 453.6 g 0   • [place] Thyroid 15 MG tablet TAKE ONE TABLET BY MOUTH THREE TIMES DAILY (ntbs)       No facility-administered medications prior to visit.        Patient Active Problem List   Diagnosis   • Hypothyroidism   • Mixed hyperlipidemia   • Purpura senilis (HCC)   • Anxiety   • Itching         Patient Care Team:  Jorge Vázquez MD as PCP - General  Martha Gardiner MA as Medical Assistant (Family Medicine)  Berkley Turk MA as Medical Assistant  Recent Hospitalizations:  none    Age-appropriate Screening Schedule:  Refer to the list below for future screening recommendations based on patient's age. Orders for these recommended tests are listed in the plan section. The patient has been provided with a written  plan.      Health Maintenance   Topic Date Due   • TDAP/TD VACCINES (2 - Td or Tdap) 08/26/2023 (Originally 2/10/2022)   • INFLUENZA VACCINE  10/01/2022   • MAMMOGRAM  06/28/2023   • DXA SCAN  06/28/2023   • ANNUAL WELLNESS VISIT  08/26/2023   • LIPID PANEL  08/26/2023   • COLORECTAL CANCER SCREENING  07/26/2028   • HEPATITIS C SCREENING  Completed   • COVID-19 Vaccine  Completed   • Pneumococcal Vaccine 65+  Completed   • ZOSTER VACCINE  Completed       Depression Screen:   PHQ-2/PHQ-9 Depression Screening 8/26/2022   Retired PHQ-9 Total Score -   Retired Total Score -   Little Interest or Pleasure in Doing Things 0-->not at all   Feeling Down, Depressed or Hopeless 0-->not at all   PHQ-9: Brief Depression Severity Measure Score 0       Functional and Cognitive Screening:  Functional & Cognitive Status 8/26/2022   Do you have difficulty preparing food and eating? No   Do you have difficulty bathing yourself, getting dressed or grooming yourself? No   Do you have difficulty using the toilet? No   Do you have difficulty moving around from place to place? No   Do you have trouble with steps or getting out of a bed or a chair? No   Current Diet Well Balanced Diet   Dental Exam Up to date   Eye Exam Up to date   Exercise (times per week) 3 times per week   Current Exercises Include Swimming;Walking   Current Exercise Activities Include -   Do you need help using the phone?  No   Are you deaf or do you have serious difficulty hearing?  Yes   Do you need help with transportation? No   Do you need help shopping? No   Do you need help preparing meals?  No   Do you need help with housework?  No   Do you need help with laundry? No   Do you need help taking your medications? No   Do you need help managing money? No   Do you ever drive or ride in a car without wearing a seat belt? No   Have you felt unusual stress, anger or loneliness in the last month? No   Who do you live with? Spouse   If you need help, do you have trouble  "finding someone available to you? No   Have you been bothered in the last four weeks by sexual problems? No   Do you have difficulty concentrating, remembering or making decisions? No     Does the patient have evidence of cognitive impairment? none    Compared to one year ago, the patient feels their physical health and mental health are the same.       Review of Systems   Constitutional: Negative.  Negative for fatigue.   HENT: Negative.    Eyes: Negative.    Respiratory: Negative.    Cardiovascular: Negative.    Gastrointestinal: Negative.    Endocrine: Negative.    Genitourinary: Negative.    Musculoskeletal: Negative.         Left wrist pain   Skin: Negative.         Lesion on lip   Allergic/Immunologic: Negative.    Neurological: Negative.    Hematological: Negative.    Psychiatric/Behavioral: Negative.        Objective     Vitals:    08/26/22 1309   BP: 124/76   Pulse: 96   Resp: 16   SpO2: 98%   Weight: 64.4 kg (142 lb)   Height: 167.6 cm (66\")       Body mass index is 22.92 kg/m².    PHYSICAL EXAM  Vitals reviewed and on chart.  HEENT: PERRLA, EOMI. Oral mucosa moist,   No LAD.  Small elevated lesion on left lower lip.  CV: RRR, no murmurs, rubs, clicks or gallops  LUNGS: CTA bilaterally  EXT: No edema, FROM in bilateral upper and lower ext, she is wearing a left wrist splint.    NEURO: CN II - XII grossly intact  PSYCH: good mood, positive affect, alert and engaged. No thoughts of self harm  expressed.    ASSESSMENT AND PLAN  Mammogram:UTD  Colon cancer screening :UTD  Bone Density :UTD      Problem List Items Addressed This Visit        Cardiac and Vasculature    Mixed hyperlipidemia    Overview     Declining treatment at this time.  Labs will be checked today reported to Rita.         Relevant Orders    Lipid Panel With LDL / HDL Ratio (Completed)       Endocrine and Metabolic    Hypothyroidism    Overview       She is now on levothyroxine.  She is taking 75 mcg a day.  Her concern right now is that she " does not feel as well as she did on the [place] Thyroid, but notes she is doing better..  Unfortunately, [place] Thyroid is hard to find and incredibly expensive.  Labs will be ordered today to evaluate her response to therapy.         Relevant Orders    TSH (Completed)       Mental Health    Anxiety    Overview     She is well managed on current meds and reports she is managing her anxiety well on this medication.  This medication helps with daily life and relationships. Will refill as needed and advised 6 month follow up.            Skin    Itching    Overview     This is a chronic problem and she finds that the itching is much worse at night.  She gets relief by taking Atarax 25 mg on as-needed basis at night to sleep.           Other Visit Diagnoses     Medicare annual wellness visit, subsequent    -  Primary    High risk medication use        Relevant Orders    Comprehensive Metabolic Panel (Completed)    CBC (No Diff) (Completed)    Lip lesion        Relevant Orders    Ambulatory Referral to Dermatology (Completed)          Orders:  Orders Placed This Encounter   Procedures   • Lipid Panel With LDL / HDL Ratio   • Comprehensive Metabolic Panel   • CBC (No Diff)   • TSH   • Ambulatory Referral to Dermatology       Follow Up:  Return in about 6 months (around 2/26/2023) for Recheck.     ADVANCED DIRECTIVES:   ACP discussion was held with the patient during this visit. Patient has an advance directive (not in EMR), copy requested.    An After Visit Summary and PPPS with all of these plans were given to the patient.

## 2022-08-27 LAB
ALBUMIN SERPL-MCNC: 4.7 G/DL (ref 3.7–4.7)
ALBUMIN/GLOB SERPL: 2.6 {RATIO} (ref 1.2–2.2)
ALP SERPL-CCNC: 62 IU/L (ref 44–121)
ALT SERPL-CCNC: 21 IU/L (ref 0–32)
AST SERPL-CCNC: 19 IU/L (ref 0–40)
BILIRUB SERPL-MCNC: 0.3 MG/DL (ref 0–1.2)
BUN SERPL-MCNC: 11 MG/DL (ref 8–27)
BUN/CREAT SERPL: 16 (ref 12–28)
CALCIUM SERPL-MCNC: 9.4 MG/DL (ref 8.7–10.3)
CHLORIDE SERPL-SCNC: 103 MMOL/L (ref 96–106)
CHOLEST SERPL-MCNC: 181 MG/DL (ref 100–199)
CO2 SERPL-SCNC: 22 MMOL/L (ref 20–29)
CREAT SERPL-MCNC: 0.7 MG/DL (ref 0.57–1)
EGFRCR-CYS SERPLBLD CKD-EPI 2021: 89 ML/MIN/1.73
ERYTHROCYTE [DISTWIDTH] IN BLOOD BY AUTOMATED COUNT: 13.7 % (ref 11.7–15.4)
GLOBULIN SER CALC-MCNC: 1.8 G/DL (ref 1.5–4.5)
GLUCOSE SERPL-MCNC: 95 MG/DL (ref 65–99)
HCT VFR BLD AUTO: 42.9 % (ref 34–46.6)
HDLC SERPL-MCNC: 66 MG/DL
HGB BLD-MCNC: 14.6 G/DL (ref 11.1–15.9)
LDLC SERPL CALC-MCNC: 101 MG/DL (ref 0–99)
LDLC/HDLC SERPL: 1.5 RATIO (ref 0–3.2)
MCH RBC QN AUTO: 30.9 PG (ref 26.6–33)
MCHC RBC AUTO-ENTMCNC: 34 G/DL (ref 31.5–35.7)
MCV RBC AUTO: 91 FL (ref 79–97)
PLATELET # BLD AUTO: 371 X10E3/UL (ref 150–450)
POTASSIUM SERPL-SCNC: 4.4 MMOL/L (ref 3.5–5.2)
PROT SERPL-MCNC: 6.5 G/DL (ref 6–8.5)
RBC # BLD AUTO: 4.72 X10E6/UL (ref 3.77–5.28)
SODIUM SERPL-SCNC: 141 MMOL/L (ref 134–144)
TRIGL SERPL-MCNC: 74 MG/DL (ref 0–149)
TSH SERPL DL<=0.005 MIU/L-ACNC: 0.7 UIU/ML (ref 0.45–4.5)
VLDLC SERPL CALC-MCNC: 14 MG/DL (ref 5–40)
WBC # BLD AUTO: 7.9 X10E3/UL (ref 3.4–10.8)

## 2022-08-29 DIAGNOSIS — F41.9 ANXIETY: ICD-10-CM

## 2022-08-29 RX ORDER — VENLAFAXINE HYDROCHLORIDE 75 MG/1
CAPSULE, EXTENDED RELEASE ORAL
Qty: 90 CAPSULE | Refills: 0 | Status: SHIPPED | OUTPATIENT
Start: 2022-08-29 | End: 2022-08-30 | Stop reason: SDUPTHER

## 2022-08-29 RX ORDER — TRIAMCINOLONE ACETONIDE 1 MG/G
CREAM TOPICAL
Qty: 454 G | Refills: 0 | Status: SHIPPED | OUTPATIENT
Start: 2022-08-29 | End: 2022-12-19

## 2022-08-30 DIAGNOSIS — F41.9 ANXIETY: ICD-10-CM

## 2022-08-30 RX ORDER — VENLAFAXINE HYDROCHLORIDE 75 MG/1
75 CAPSULE, EXTENDED RELEASE ORAL DAILY
Qty: 90 CAPSULE | Refills: 1 | Status: SHIPPED | OUTPATIENT
Start: 2022-08-30 | End: 2022-11-23 | Stop reason: SDUPTHER

## 2022-08-30 NOTE — TELEPHONE ENCOUNTER
Caller: Rita Hendrix    Relationship: Self    Best call back number:[phone]  Requested Prescriptions:   Requested Prescriptions     Pending Prescriptions Disp Refills   • venlafaxine XR (EFFEXOR-XR) 75 MG 24 hr capsule 90 capsule 0     Sig: Take 1 capsule by mouth Daily.        Pharmacy where request should be sent: [facility] [address] [place], [place] [phone] [place] [phone] FX     Additional details provided by patient: OUT  Does the patient have less than a 3 day supply:  [x] Yes  [] No    Tim Ruiz   08/30/22 14:23 EDT

## 2022-08-31 ENCOUNTER — TELEPHONE (OUTPATIENT)
Dept: FAMILY MEDICINE CLINIC | Facility: CLINIC | Age: 77
End: 2022-08-31

## 2022-08-31 NOTE — TELEPHONE ENCOUNTER
Caller: Rita Hendrix    Relationship: Self    Best call back number:[phone]    What orders are you requesting (i.e. lab or imaging): TICKBITE TEST THAT MAKES YOU SICK WHEN YOU EAT RED MEAT    In what timeframe would the patient need to come in: ASAP    Where will you receive your lab/imaging services: [facility]

## 2022-09-01 NOTE — TELEPHONE ENCOUNTER
I suspect she is asking for an alpha - gal test . We did not discuss this at her last visit and she will need to see an allergist if that is what she is asking about.

## 2022-09-02 DIAGNOSIS — Z91.018 FOOD ALLERGY: Primary | ICD-10-CM

## 2022-09-19 ENCOUNTER — TELEPHONE (OUTPATIENT)
Dept: FAMILY MEDICINE CLINIC | Facility: CLINIC | Age: 77
End: 2022-09-19

## 2022-09-19 NOTE — TELEPHONE ENCOUNTER
Caller: Rita Hendrix    Relationship: Self    Best call back number: [phone]    What is the best time to reach you: ANYTIME    Who are you requesting to speak with (clinical staff, provider,  specific staff member):CLINICAL     What was the call regarding: STOMACH PAINS, THROWING UP, WORRIED ABOUT IT BEING GALLBLADDER RELATED, WOULD LIKE TO KNOW WHAT TO DO, OR BE SEEN AS SOON AS POSSIBLE. THEY HAVE BEEN GOING ON FOR ABOUT A WEEK AND A HALF.     Do you require a callback: YES

## 2022-11-23 DIAGNOSIS — F41.9 ANXIETY: ICD-10-CM

## 2022-11-23 DIAGNOSIS — E03.8 OTHER SPECIFIED HYPOTHYROIDISM: ICD-10-CM

## 2022-11-23 RX ORDER — LEVOTHYROXINE SODIUM 0.07 MG/1
75 TABLET ORAL DAILY
Qty: 30 TABLET | Refills: 5 | Status: SHIPPED | OUTPATIENT
Start: 2022-11-23

## 2022-11-23 RX ORDER — VENLAFAXINE HYDROCHLORIDE 75 MG/1
75 CAPSULE, EXTENDED RELEASE ORAL DAILY
Qty: 90 CAPSULE | Refills: 1 | Status: SHIPPED | OUTPATIENT
Start: 2022-11-23

## 2022-11-23 NOTE — TELEPHONE ENCOUNTER
Caller: Zach Hendrixtopher MERLOS    Relationship: Self    Best call back number: [phone]    Requested Prescriptions:   Requested Prescriptions     Pending Prescriptions Disp Refills   • venlafaxine XR (EFFEXOR-XR) 75 MG 24 hr capsule 90 capsule 1     Sig: Take 1 capsule by mouth Daily.   • levothyroxine (Synthroid) 75 MCG tablet 30 tablet 5     Sig: Take 1 tablet by mouth Daily.        Pharmacy where request should be sent: [place] [phone] [place] [phone]     Additional details provided by patient: PATIENT WOULD LIKE A 90 DAY SUPPLY ON BOTH PRESCRIPTIONS/DUMONT IS CHEAPER FOR PATIENT.    Does the patient have less than a 3 day supply:  [] Yes  [x] No    Shavonne Schilling Rep   11/23/22 11:59 EST

## 2022-12-19 ENCOUNTER — TELEPHONE (OUTPATIENT)
Dept: FAMILY MEDICINE CLINIC | Facility: CLINIC | Age: 77
End: 2022-12-19

## 2022-12-19 NOTE — TELEPHONE ENCOUNTER
Addendum to previous message - [facility] [address] in [place] is preferred pharmacy

## 2022-12-19 NOTE — TELEPHONE ENCOUNTER
Patient tested positive on a home test for Covid.  Has chills, headache,hot flashes, sweats, body aches, fatigue, no appetite.  O2 stats are down to 92 this morning.  When she took some deep breaths it went up to 95.  Please advise - patients  is interested in treatment

## 2022-12-19 NOTE — TELEPHONE ENCOUNTER
Spoke w/ pt.  She has been ill x 3 days.  Advised that if O2 drops below 90 she should be evaluated at ER  Message sent to Dr Vázquez re: Kaity

## 2023-01-11 DIAGNOSIS — L29.9 ITCHING: ICD-10-CM

## 2023-01-11 RX ORDER — HYDROXYZINE HYDROCHLORIDE 25 MG/1
25 TABLET, FILM COATED ORAL EVERY 8 HOURS PRN
Qty: 90 TABLET | Refills: 2 | Status: SHIPPED | OUTPATIENT
Start: 2023-01-11

## 2023-01-11 NOTE — TELEPHONE ENCOUNTER
Caller: Rita Hendrix    Relationship: Self    Best call back number: [phone] (Mobile)    Requested Prescriptions:   Requested Prescriptions     Pending Prescriptions Disp Refills   • hydrOXYzine (ATARAX) 25 MG tablet 90 tablet 2     Sig: Take 1 tablet by mouth Every 8 (Eight) Hours As Needed for Itching.        [facility] sent[facility] [address] [place] [address] [phone] PH - [phone] FX     Additional details provided by patient: PATIENT NEEDS NEW PRESCRIPTION SENT TO NEW PHARMACY ASAP, PLEASE ADVISE PATIENT WHEN SENT TO PHARMACY ASAP    Does the patient have less than a 3 day supply:  [x] Yes  [] No    Would you like a call back once the refill request has been completed: [x] Yes [] No    If the office needs to give you a call back, can they leave a voicemail: [x] Yes [] No    Shavonne Jinag Rep   01/11/23 10:37 EST

## 2023-03-31 ENCOUNTER — OFFICE VISIT (OUTPATIENT)
Dept: FAMILY MEDICINE CLINIC | Facility: CLINIC | Age: 78
End: 2023-03-31
Payer: MEDICARE

## 2023-03-31 VITALS
SYSTOLIC BLOOD PRESSURE: 144 MMHG | HEART RATE: 82 BPM | RESPIRATION RATE: 16 BRPM | OXYGEN SATURATION: 99 % | HEIGHT: 66 IN | DIASTOLIC BLOOD PRESSURE: 84 MMHG | BODY MASS INDEX: 23.46 KG/M2 | WEIGHT: 146 LBS

## 2023-03-31 DIAGNOSIS — E03.8 OTHER SPECIFIED HYPOTHYROIDISM: ICD-10-CM

## 2023-03-31 DIAGNOSIS — E78.2 MIXED HYPERLIPIDEMIA: Primary | ICD-10-CM

## 2023-03-31 DIAGNOSIS — G89.29 OTHER CHRONIC PAIN: ICD-10-CM

## 2023-03-31 DIAGNOSIS — F41.9 ANXIETY: ICD-10-CM

## 2023-03-31 PROCEDURE — 99214 OFFICE O/P EST MOD 30 MIN: CPT | Performed by: FAMILY MEDICINE

## 2023-03-31 NOTE — PROGRESS NOTES
Subjective   Rita Hendrix is a 77 y.o. female.   Hypothyroidism, Hyperlipidemia, and Arthritis    History of Present Illness   Rita is here for follow up of thyroid disease and cholesterol.  She switched from [place] thyroid to levothyroxine and is doing well.    She has chronic anxiety and has been on Effexor for a long time.  She has been taking venlafaxine 25 mg a day and states that she is doing well on this medication and she would like to continue to take it.    She has chronic hypothyroidism and due to cost she has been willing to switch from [place] Thyroid to levothyroxine.  This is managed by me.  When she first started taking levothyroxine she was really tired but now she feels like she is getting better and she is looking forward to seeing if her levels are within normal limits on today's labs.    She has chronic hyperlipidemia. Maternal Grandfather and 2 Aunts had heart issues/high cholestrol. Has not tried a statin in the past.      She has chronic urticaria and has a long hx of a skin rash that is usually related to allergies  and she is taking  Atarax to help with this.  I help her manage this.  It mostly helps with sleep.     She has chronic migraine headaches. This is a chronic problem and responds well to treatment. Takes Imitrex approx 1x month.  I manage her treatment.     She she also notes that she has chronic pain in her wrists and hands.  She also has arthritic pain that is chronic in her knees.  Over-the-counter medicines help only a bit.  She is finding it difficult to walk or use her hands and arms without pain.  She notes that medical marijuana has been the best relief for her and she is [place] it legally out of state.  She is requesting a letter to keep with her in the event that she is questioned about her purchase.       Review of Systems   Constitutional: Negative.    HENT: Negative.    Eyes: Negative.    Respiratory: Negative.    Cardiovascular: Negative.    Genitourinary:  "Negative.    Musculoskeletal:        Bilateral wrist pain and hand pain, bilateral knee pain   Skin: Negative.    Neurological: Negative.        Objective   Vitals:    03/31/23 1318   BP: 144/84   Pulse: 82   Resp: 16   SpO2: 99%   Weight: 66.2 kg (146 lb)   Height: 167.6 cm (66\")      Body mass index is 23.57 kg/m².    Physical Exam  Constitutional:       Appearance: Normal appearance.   Cardiovascular:      Rate and Rhythm: Normal rate.      Pulses: Normal pulses.   Pulmonary:      Effort: Pulmonary effort is normal.      Breath sounds: Normal breath sounds.   Musculoskeletal:      Comments:   Tenderness to palpation on anterior wrists.  No swelling or redness noted.  Decreased range of motion with rotation.   Neurological:      Mental Status: She is alert.           Assessment & Plan   Problem List Items Addressed This Visit        Cardiac and Vasculature    Mixed hyperlipidemia - Primary    Overview     Declining treatment at this time.  Labs will be checked today reported to Rita.         Relevant Orders    Comprehensive metabolic panel (Completed)       Endocrine and Metabolic    Hypothyroidism    Overview       She is now on levothyroxine.  She is taking 75 mcg a day. She is doing well on this medication and reports no concerning problems. .         Relevant Orders    TSH (Completed)    CBC (No Diff) (Completed)       Mental Health    Anxiety    Overview     She is well managed on current meds and reports she is managing her anxiety well on this medication.  This medication helps with daily life and relationships. Will refill as needed.           Relevant Orders    CBC (No Diff) (Completed)       Neuro    Other chronic pain    Overview     This is been a long-term problem for her.  She notes that she has significant pain in both wrists especially with flexion and extension, painful arthritis in her hands and she has pain in her knees.  Over-the-counter medications have not worked well and she is not " interested in taking prescription pain medication.  She notes that medical marijuana has helped her quite a bit and she is legally purchased and states that provided.  A letter has been given to express the need and the requirement for her to have medical marijuana in her possession.               Orders Placed This Encounter   Procedures   • TSH   • Comprehensive metabolic panel   • CBC (No Diff)        Return in about 6 months (around 9/30/2023).

## 2023-04-01 LAB
ALBUMIN SERPL-MCNC: 4.7 G/DL (ref 3.7–4.7)
ALBUMIN/GLOB SERPL: 2.6 {RATIO} (ref 1.2–2.2)
ALP SERPL-CCNC: 65 IU/L (ref 44–121)
ALT SERPL-CCNC: 15 IU/L (ref 0–32)
AST SERPL-CCNC: 14 IU/L (ref 0–40)
BILIRUB SERPL-MCNC: 0.4 MG/DL (ref 0–1.2)
BUN SERPL-MCNC: 19 MG/DL (ref 8–27)
BUN/CREAT SERPL: 25 (ref 12–28)
CALCIUM SERPL-MCNC: 9.6 MG/DL (ref 8.7–10.3)
CHLORIDE SERPL-SCNC: 101 MMOL/L (ref 96–106)
CO2 SERPL-SCNC: 24 MMOL/L (ref 20–29)
CREAT SERPL-MCNC: 0.77 MG/DL (ref 0.57–1)
EGFRCR SERPLBLD CKD-EPI 2021: 79 ML/MIN/1.73
ERYTHROCYTE [DISTWIDTH] IN BLOOD BY AUTOMATED COUNT: 13 % (ref 11.7–15.4)
GLOBULIN SER CALC-MCNC: 1.8 G/DL (ref 1.5–4.5)
GLUCOSE SERPL-MCNC: 87 MG/DL (ref 70–99)
HCT VFR BLD AUTO: 44.3 % (ref 34–46.6)
HGB BLD-MCNC: 14.9 G/DL (ref 11.1–15.9)
MCH RBC QN AUTO: 30.3 PG (ref 26.6–33)
MCHC RBC AUTO-ENTMCNC: 33.6 G/DL (ref 31.5–35.7)
MCV RBC AUTO: 90 FL (ref 79–97)
PLATELET # BLD AUTO: 367 X10E3/UL (ref 150–450)
POTASSIUM SERPL-SCNC: 4.7 MMOL/L (ref 3.5–5.2)
PROT SERPL-MCNC: 6.5 G/DL (ref 6–8.5)
RBC # BLD AUTO: 4.92 X10E6/UL (ref 3.77–5.28)
SODIUM SERPL-SCNC: 139 MMOL/L (ref 134–144)
TSH SERPL DL<=0.005 MIU/L-ACNC: 0.63 UIU/ML (ref 0.45–4.5)
WBC # BLD AUTO: 7.3 X10E3/UL (ref 3.4–10.8)

## 2023-04-02 PROBLEM — G89.29 OTHER CHRONIC PAIN: Status: ACTIVE | Noted: 2023-04-02

## 2023-04-10 ENCOUNTER — TELEPHONE (OUTPATIENT)
Dept: FAMILY MEDICINE CLINIC | Facility: CLINIC | Age: 78
End: 2023-04-10

## 2023-04-10 NOTE — TELEPHONE ENCOUNTER
Caller: Rita Hendrix    Relationship: Self    Best call back number:[phone]    Do you require a callback: YES-PATIENT REQUESTS TO SPEAK TO LA NENA. SHE WOULD ONLY SAY IT WAS REGARDING A MEDICATION. PLEASE CALL.

## 2023-04-11 RX ORDER — VENLAFAXINE HYDROCHLORIDE 150 MG/1
150 CAPSULE, EXTENDED RELEASE ORAL DAILY
Qty: 30 CAPSULE | Refills: 1 | Status: SHIPPED | OUTPATIENT
Start: 2023-04-11

## 2023-04-11 NOTE — TELEPHONE ENCOUNTER
Spoke w/ pt and Dr Vázquez Increased dose sent to pharmacy and pt will follow up in 4-6 weeks w/ Dr Vázquez.  Pt to call sooner if any problems

## 2023-04-11 NOTE — TELEPHONE ENCOUNTER
PATIENT CALLING TO GIVE FACTS ON REQUEST:    RAISING THE venlafaxine XR (EFFEXOR-XR)  FROM 75 MG  MG    IF AGREES TO INCREASE DOSE PLEASE SEND TO   [address] - [facility] - [phone] [place] [phone] [phone]

## 2023-04-26 ENCOUNTER — OFFICE VISIT (OUTPATIENT)
Dept: FAMILY MEDICINE CLINIC | Facility: CLINIC | Age: 78
End: 2023-04-26
Payer: MEDICARE

## 2023-04-26 VITALS
DIASTOLIC BLOOD PRESSURE: 90 MMHG | WEIGHT: 146 LBS | RESPIRATION RATE: 16 BRPM | SYSTOLIC BLOOD PRESSURE: 148 MMHG | OXYGEN SATURATION: 97 % | HEIGHT: 66 IN | HEART RATE: 84 BPM | BODY MASS INDEX: 23.46 KG/M2

## 2023-04-26 DIAGNOSIS — F41.9 ANXIETY: Primary | ICD-10-CM

## 2023-04-26 PROCEDURE — 1160F RVW MEDS BY RX/DR IN RCRD: CPT | Performed by: FAMILY MEDICINE

## 2023-04-26 PROCEDURE — 99213 OFFICE O/P EST LOW 20 MIN: CPT | Performed by: FAMILY MEDICINE

## 2023-04-26 PROCEDURE — 1159F MED LIST DOCD IN RCRD: CPT | Performed by: FAMILY MEDICINE

## 2023-04-26 RX ORDER — VENLAFAXINE HYDROCHLORIDE 37.5 MG/1
37.5 CAPSULE, EXTENDED RELEASE ORAL DAILY
Qty: 90 CAPSULE | Refills: 1 | Status: SHIPPED | OUTPATIENT
Start: 2023-04-26

## 2023-04-26 RX ORDER — VENLAFAXINE HYDROCHLORIDE 75 MG/1
75 CAPSULE, EXTENDED RELEASE ORAL DAILY
Qty: 90 CAPSULE | Refills: 1 | Status: SHIPPED | OUTPATIENT
Start: 2023-04-26

## 2023-04-26 NOTE — PROGRESS NOTES
"Subjective   Ritaerin Hendrix is a 77 y.o. female.   Anxiety (Medication)    History of Present Illness   Rita is here to discuss anxiety.  At her last visit her Effexor was increased.  She is concerned about doubling her dose when she feels like she only needs this on occasion.  She is here to discuss.  We talked about alternatives and she was interested in adding a half dose ( 37.5 mg of venlafaxine XR ) to see if that will help with her concerns about increased anxiety.    Review of Systems   Constitutional: Negative.    HENT: Negative.    Eyes: Negative.    Respiratory: Negative.    Cardiovascular: Negative.    Skin: Negative.    Neurological: Negative.    Psychiatric/Behavioral: The patient is nervous/anxious.        Objective   Vitals:    04/26/23 1044   BP: 148/90   Pulse: 84   Resp: 16   SpO2: 97%   Weight: 66.2 kg (146 lb)   Height: 167.6 cm (66\")      Body mass index is 23.57 kg/m².    Physical Exam  Vitals and nursing note reviewed.   Constitutional:       General: She is not in acute distress.     Appearance: She is well-developed.   Eyes:      Pupils: Pupils are equal, round, and reactive to light.   Cardiovascular:      Rate and Rhythm: Normal rate and regular rhythm.   Pulmonary:      Effort: Pulmonary effort is normal.   Neurological:      Mental Status: She is alert and oriented to person, place, and time.   Psychiatric:         Mood and Affect: Mood normal.         Behavior: Behavior normal.         Thought Content: Thought content normal.         Judgment: Judgment normal.           Assessment & Plan   Problem List Items Addressed This Visit        Mental Health    Anxiety - Primary    Overview     She is does not feel that her anxiety is well managed on current the current dose of venlafaxine and reports increased anxiety. She feels better about adding a half dose increase to current dose and see if this will make a difference. I have encouraged her to follow up with me in 6 weeks.         " Relevant Medications    venlafaxine XR (Effexor XR) 75 MG 24 hr capsule    venlafaxine XR (Effexor XR) 37.5 MG 24 hr capsule          No orders of the defined types were placed in this encounter.       Return in about 6 weeks (around 6/7/2023) for Recheck.

## 2023-05-20 DIAGNOSIS — E03.8 OTHER SPECIFIED HYPOTHYROIDISM: ICD-10-CM

## 2023-05-22 RX ORDER — LEVOTHYROXINE SODIUM 0.07 MG/1
TABLET ORAL
Qty: 90 TABLET | Refills: 0 | Status: SHIPPED | OUTPATIENT
Start: 2023-05-22

## 2023-07-22 DIAGNOSIS — L29.9 ITCHING: ICD-10-CM

## 2023-07-24 RX ORDER — HYDROXYZINE HYDROCHLORIDE 25 MG/1
TABLET, FILM COATED ORAL
Qty: 90 TABLET | Refills: 0 | Status: SHIPPED | OUTPATIENT
Start: 2023-07-24

## 2023-08-18 ENCOUNTER — TELEPHONE (OUTPATIENT)
Dept: FAMILY MEDICINE CLINIC | Facility: CLINIC | Age: 78
End: 2023-08-18

## 2023-08-18 DIAGNOSIS — L29.9 ITCHING: Primary | ICD-10-CM

## 2023-08-18 RX ORDER — METHYLPREDNISOLONE 4 MG/1
TABLET ORAL
Qty: 1 EACH | Refills: 0 | Status: SHIPPED | OUTPATIENT
Start: 2023-08-18

## 2023-08-18 NOTE — TELEPHONE ENCOUNTER
Caller: Rita Hendrix    Relationship: Self    Best call back number: [phone]    What medication are you requesting: MEDICATION FOR ITCHINESS     What are your current symptoms: PATIENT STATES THAT SHE HASN'T SLEPT MUCH THE LAST FEW NIGHTS, SHE HAS BEEN ITCHING A LOT, SHE STATES THAT SHE HAS [place] BITES ALL OVER AND THEY ARE STARTING TO HEAL BUT ARE STILL ITCHING   How long have you been experiencing symptoms: A WEEK     Have you had these symptoms before:    [] Yes  [x] No    Have you been treated for these symptoms before:   [x] Yes  [] No    If a prescription is needed, what is your preferred pharmacy and phone number: [facility] [address] [facility] [phone] [facility] [phone] FX

## 2023-09-17 DIAGNOSIS — E03.8 OTHER SPECIFIED HYPOTHYROIDISM: ICD-10-CM

## 2023-09-18 RX ORDER — LEVOTHYROXINE SODIUM 0.07 MG/1
TABLET ORAL
Qty: 90 TABLET | Refills: 0 | Status: SHIPPED | OUTPATIENT
Start: 2023-09-18

## 2023-09-24 DIAGNOSIS — F41.9 ANXIETY: ICD-10-CM

## 2023-09-25 RX ORDER — VENLAFAXINE HYDROCHLORIDE 37.5 MG/1
37.5 CAPSULE, EXTENDED RELEASE ORAL DAILY
Qty: 90 CAPSULE | Refills: 0 | Status: SHIPPED | OUTPATIENT
Start: 2023-09-25

## 2023-09-27 ENCOUNTER — TRANSCRIBE ORDERS (OUTPATIENT)
Dept: FAMILY MEDICINE CLINIC | Facility: CLINIC | Age: 78
End: 2023-09-27
Payer: COMMERCIAL

## 2023-09-27 DIAGNOSIS — Z12.31 VISIT FOR SCREENING MAMMOGRAM: Primary | ICD-10-CM

## 2023-10-04 ENCOUNTER — HOSPITAL ENCOUNTER (OUTPATIENT)
Dept: MAMMOGRAPHY | Facility: HOSPITAL | Age: 78
Discharge: HOME | End: 2023-10-04
Admitting: FAMILY MEDICINE
Payer: MEDICARE

## 2023-10-04 DIAGNOSIS — Z12.31 VISIT FOR SCREENING MAMMOGRAM: ICD-10-CM

## 2023-10-04 PROCEDURE — 77063 BREAST TOMOSYNTHESIS BI: CPT

## 2023-10-04 PROCEDURE — 77067 SCR MAMMO BI INCL CAD: CPT

## 2023-10-19 DIAGNOSIS — F41.9 ANXIETY: ICD-10-CM

## 2023-10-19 RX ORDER — VENLAFAXINE HYDROCHLORIDE 75 MG/1
75 CAPSULE, EXTENDED RELEASE ORAL DAILY
Qty: 90 CAPSULE | Refills: 0 | Status: SHIPPED | OUTPATIENT
Start: 2023-10-19

## 2023-10-20 ENCOUNTER — OFFICE VISIT (OUTPATIENT)
Dept: FAMILY MEDICINE CLINIC | Facility: CLINIC | Age: 78
End: 2023-10-20
Payer: MEDICARE

## 2023-10-20 VITALS
DIASTOLIC BLOOD PRESSURE: 82 MMHG | WEIGHT: 145 LBS | SYSTOLIC BLOOD PRESSURE: 138 MMHG | BODY MASS INDEX: 23.3 KG/M2 | RESPIRATION RATE: 18 BRPM | HEART RATE: 81 BPM | HEIGHT: 66 IN | OXYGEN SATURATION: 96 %

## 2023-10-20 DIAGNOSIS — E03.8 OTHER SPECIFIED HYPOTHYROIDISM: Primary | ICD-10-CM

## 2023-10-20 DIAGNOSIS — F41.9 ANXIETY: ICD-10-CM

## 2023-10-20 DIAGNOSIS — Z23 NEED FOR VACCINATION: ICD-10-CM

## 2023-10-20 DIAGNOSIS — E78.2 MIXED HYPERLIPIDEMIA: ICD-10-CM

## 2023-10-20 DIAGNOSIS — L29.9 ITCHING: ICD-10-CM

## 2023-10-20 LAB
ERYTHROCYTE [DISTWIDTH] IN BLOOD BY AUTOMATED COUNT: 13 % (ref 12.3–15.4)
HCT VFR BLD AUTO: 46 % (ref 34–46.6)
HGB BLD-MCNC: 15.8 G/DL (ref 12–15.9)
MCH RBC QN AUTO: 31 PG (ref 26.6–33)
MCHC RBC AUTO-ENTMCNC: 34.3 G/DL (ref 31.5–35.7)
MCV RBC AUTO: 90.2 FL (ref 79–97)
PLATELET # BLD AUTO: 371 10*3/MM3 (ref 140–450)
RBC # BLD AUTO: 5.1 10*6/MM3 (ref 3.77–5.28)
WBC # BLD AUTO: 7.14 10*3/MM3 (ref 3.4–10.8)

## 2023-10-20 NOTE — PROGRESS NOTES
"Subjective   Rita Hendrix is a 78 y.o. female.   Med Refill    History of Present Illness: Rita presents today for a medication refill and vaccinations. She is worried about maybe pulling her quad muscle, other than that she has no other complaints  She has chronic anxiety and has been on Effexor for a long time.  She has been taking venlafaxine 75  mg and 37.5 mg a day and states that she is doing well on this medication and she would like to continue to take it.     She has chronic hypothyroidism and due to cost she has been willing to switch from [place] Thyroid to levothyroxine.  This is managed by me.  When she first started taking levothyroxine she was really tired but now she feels like she is getting better and she is looking forward to seeing if her levels are within normal limits on today's labs.     She has chronic hyperlipidemia. Maternal Grandfather and 2 Aunts had heart issues/high cholestrol. Has not tried a statin in the past.      She has chronic urticaria and has a long hx of a skin rash that is usually related to allergies  and she is taking  Atarax to help with this.  I help her manage this.  It mostly helps with sleep.      She has chronic migraine headaches. This is a chronic problem and responds well to treatment. Takes Imitrex approx 1x month.  I manage her treatment.     Review of Systems   Constitutional: Negative.    HENT: Negative.     Eyes: Negative.    Respiratory: Negative.     Cardiovascular: Negative.    Skin: Negative.    Neurological: Negative.        Objective   Vitals:    10/20/23 1038   BP: 138/82   Pulse: 81   Resp: 18   SpO2: 96%   Weight: 65.8 kg (145 lb)   Height: 167.6 cm (66\")      Body mass index is 23.4 kg/m².  BMI is within normal parameters. No other follow-up for BMI required.     Physical Exam  Vitals and nursing note reviewed.   Constitutional:       General: She is not in acute distress.     Appearance: She is well-developed.   Eyes:      Pupils: Pupils are " equal, round, and reactive to light.   Cardiovascular:      Rate and Rhythm: Normal rate and regular rhythm.   Pulmonary:      Effort: Pulmonary effort is normal.   Neurological:      Mental Status: She is alert and oriented to person, place, and time.   Psychiatric:         Mood and Affect: Mood normal.         Behavior: Behavior normal.         Thought Content: Thought content normal.         Judgment: Judgment normal.           Assessment & Plan   Problem List Items Addressed This Visit          Cardiac and Vasculature    Mixed hyperlipidemia    Overview     Declining treatment at this time.  Labs will be checked today reported to Rita.         Relevant Orders    Comprehensive metabolic panel    Lipid Panel       Endocrine and Metabolic    Hypothyroidism - Primary    Overview       She is now on levothyroxine.  She is taking 75 mcg a day. She is doing well on this medication and reports no concerning problems. .         Relevant Orders    TSH    Comprehensive metabolic panel    CBC (No Diff)       Mental Health    Anxiety    Overview     She  is well on current the current dose of venlafaxine.  She is now tacking venlafaxine XR 75 mg and 37.5 mg a day and feels this is working well for her.            Skin    Itching    Overview     This is a chronic problem and she finds that the itching is much worse at night.  She gets relief by taking Atarax 25 mg on as-needed basis at night to sleep.          Other Visit Diagnoses       Need for vaccination        Relevant Orders    Fluzone High-Dose 65+yrs (5004-9847) (Completed)    COVID-19 F23 (Pfizer) 12yrs+ (COMIRNATY) (Completed)               Orders Placed This Encounter   Procedures    Fluzone High-Dose 65+yrs (6999-1146)    COVID-19 F23 (Pfizer) 12yrs+ (COMIRNATY)    TSH    Comprehensive metabolic panel    CBC (No Diff)    Lipid Panel        Return in about 6 months (around 4/20/2024) for Annual physical.

## 2023-10-21 LAB
ALBUMIN SERPL-MCNC: 4.7 G/DL (ref 3.5–5.2)
ALBUMIN/GLOB SERPL: 2.5 G/DL
ALP SERPL-CCNC: 73 U/L (ref 39–117)
ALT SERPL-CCNC: 12 U/L (ref 1–33)
AST SERPL-CCNC: 15 U/L (ref 1–32)
BILIRUB SERPL-MCNC: 0.3 MG/DL (ref 0–1.2)
BUN SERPL-MCNC: 19 MG/DL (ref 8–23)
BUN/CREAT SERPL: 24.7 (ref 7–25)
CALCIUM SERPL-MCNC: 10.1 MG/DL (ref 8.6–10.5)
CHLORIDE SERPL-SCNC: 101 MMOL/L (ref 98–107)
CHOLEST SERPL-MCNC: 229 MG/DL (ref 0–200)
CO2 SERPL-SCNC: 29.2 MMOL/L (ref 22–29)
CREAT SERPL-MCNC: 0.77 MG/DL (ref 0.57–1)
EGFRCR SERPLBLD CKD-EPI 2021: 79.1 ML/MIN/1.73
GLOBULIN SER CALC-MCNC: 1.9 GM/DL
GLUCOSE SERPL-MCNC: 97 MG/DL (ref 65–99)
HDLC SERPL-MCNC: 86 MG/DL (ref 40–60)
LDLC SERPL CALC-MCNC: 129 MG/DL (ref 0–100)
POTASSIUM SERPL-SCNC: 4.6 MMOL/L (ref 3.5–5.2)
PROT SERPL-MCNC: 6.6 G/DL (ref 6–8.5)
SODIUM SERPL-SCNC: 139 MMOL/L (ref 136–145)
TRIGL SERPL-MCNC: 84 MG/DL (ref 0–150)
TSH SERPL DL<=0.005 MIU/L-ACNC: 0.18 UIU/ML (ref 0.27–4.2)
VLDLC SERPL CALC-MCNC: 14 MG/DL (ref 5–40)

## 2023-10-24 DIAGNOSIS — E03.9 HYPOTHYROIDISM, UNSPECIFIED TYPE: Primary | ICD-10-CM

## 2023-11-06 ENCOUNTER — TELEPHONE (OUTPATIENT)
Dept: FAMILY MEDICINE CLINIC | Facility: CLINIC | Age: 78
End: 2023-11-06
Payer: MEDICARE

## 2023-11-06 NOTE — TELEPHONE ENCOUNTER
Patient is concerned about mention of fatigue on her AVS from her visit with Dr. Vázquez on 10/20.  Please call patient to discuss.  Mentioned in regards to her hypothyroidism.

## 2023-11-07 NOTE — TELEPHONE ENCOUNTER
Patient returned  your call. She has a telehealth appointment at 1:00 and will be available any time after 2:00 pm today.

## 2023-11-13 ENCOUNTER — TELEPHONE (OUTPATIENT)
Dept: FAMILY MEDICINE CLINIC | Facility: CLINIC | Age: 78
End: 2023-11-13

## 2023-11-13 DIAGNOSIS — E03.8 OTHER SPECIFIED HYPOTHYROIDISM: ICD-10-CM

## 2023-11-13 NOTE — TELEPHONE ENCOUNTER
Hub staff attempted to follow warm transfer process and was unsuccessful     Caller: Rita Hendrix    Relationship to patient: Self    Best call back number: [phone]    Patient is needing: PATIENT CALLED AND STATED SHE DOES NOT NEED YOU TO FILL OUT THE PAPERWORK. 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 THANKS

## 2023-11-13 NOTE — TELEPHONE ENCOUNTER
Caller: Rita Hendrix    Relationship: Self    Best call back number: [phone]    What was the call regarding: PATIENT STATED THAT THEY WERE SUPPOSED TO HAVE A COLONOSCOPY DONE TODAY BUT THAT THE PHARMACY DIDN'T GET THE MEDICATION FILLED UNTIL TODAY. STATED THAT THE MEDICATION WAS OVER $200 AND THAT THE PHARMACY INFORMED HER TO HAVE DR. GONZALEZ DO SOME PAPERWORK TO GET THIS CHEAPER. PLEASE CALL AND ADVISE ON FURTHER ACTION

## 2023-11-14 RX ORDER — LEVOTHYROXINE SODIUM 0.07 MG/1
TABLET ORAL
Qty: 90 TABLET | Refills: 0 | Status: SHIPPED | OUTPATIENT
Start: 2023-11-14

## 2023-11-30 VITALS
HEIGHT: 67 IN | HEART RATE: 86 BPM | OXYGEN SATURATION: 89 % | HEART RATE: 84 BPM | RESPIRATION RATE: 19 BRPM | HEART RATE: 68 BPM | DIASTOLIC BLOOD PRESSURE: 77 MMHG | SYSTOLIC BLOOD PRESSURE: 122 MMHG | HEART RATE: 71 BPM | RESPIRATION RATE: 16 BRPM | DIASTOLIC BLOOD PRESSURE: 67 MMHG | OXYGEN SATURATION: 95 % | OXYGEN SATURATION: 97 % | SYSTOLIC BLOOD PRESSURE: 153 MMHG | HEART RATE: 73 BPM | DIASTOLIC BLOOD PRESSURE: 76 MMHG | OXYGEN SATURATION: 93 % | SYSTOLIC BLOOD PRESSURE: 140 MMHG | SYSTOLIC BLOOD PRESSURE: 146 MMHG | TEMPERATURE: 97.1 F | HEART RATE: 74 BPM | RESPIRATION RATE: 12 BRPM | DIASTOLIC BLOOD PRESSURE: 102 MMHG | SYSTOLIC BLOOD PRESSURE: 117 MMHG | HEART RATE: 63 BPM | DIASTOLIC BLOOD PRESSURE: 68 MMHG | DIASTOLIC BLOOD PRESSURE: 89 MMHG | RESPIRATION RATE: 18 BRPM | SYSTOLIC BLOOD PRESSURE: 165 MMHG | RESPIRATION RATE: 22 BRPM | SYSTOLIC BLOOD PRESSURE: 164 MMHG | RESPIRATION RATE: 21 BRPM | HEART RATE: 75 BPM | HEART RATE: 79 BPM | TEMPERATURE: 98 F | DIASTOLIC BLOOD PRESSURE: 91 MMHG | DIASTOLIC BLOOD PRESSURE: 60 MMHG | OXYGEN SATURATION: 99 % | WEIGHT: 138 LBS | SYSTOLIC BLOOD PRESSURE: 135 MMHG | DIASTOLIC BLOOD PRESSURE: 90 MMHG | SYSTOLIC BLOOD PRESSURE: 170 MMHG | OXYGEN SATURATION: 94 %

## 2023-12-01 ENCOUNTER — OFFICE (AMBULATORY)
Dept: URBAN - METROPOLITAN AREA PATHOLOGY 4 | Facility: PATHOLOGY | Age: 78
End: 2023-12-01

## 2023-12-01 ENCOUNTER — AMBULATORY SURGICAL CENTER (AMBULATORY)
Dept: URBAN - METROPOLITAN AREA SURGERY 17 | Facility: SURGERY | Age: 78
End: 2023-12-01

## 2023-12-01 DIAGNOSIS — Z86.010 PERSONAL HISTORY OF COLONIC POLYPS: ICD-10-CM

## 2023-12-01 DIAGNOSIS — Z09 ENCOUNTER FOR FOLLOW-UP EXAMINATION AFTER COMPLETED TREATMEN: ICD-10-CM

## 2023-12-01 DIAGNOSIS — D12.2 BENIGN NEOPLASM OF ASCENDING COLON: ICD-10-CM

## 2023-12-01 DIAGNOSIS — D12.8 BENIGN NEOPLASM OF RECTUM: ICD-10-CM

## 2023-12-01 DIAGNOSIS — K63.5 POLYP OF COLON: ICD-10-CM

## 2023-12-01 PROBLEM — K62.1 RECTAL POLYP: Status: ACTIVE | Noted: 2023-12-01

## 2023-12-01 LAB
GI HISTOLOGY: A. SELECT: (no result)
GI HISTOLOGY: B. SELECT: (no result)
GI HISTOLOGY: PDF REPORT: (no result)

## 2023-12-01 PROCEDURE — 88305 TISSUE EXAM BY PATHOLOGIST: CPT | Performed by: INTERNAL MEDICINE

## 2023-12-01 PROCEDURE — 45385 COLONOSCOPY W/LESION REMOVAL: CPT | Mod: PT | Performed by: INTERNAL MEDICINE

## 2023-12-29 DIAGNOSIS — F41.9 ANXIETY: ICD-10-CM

## 2023-12-29 RX ORDER — VENLAFAXINE HYDROCHLORIDE 37.5 MG/1
37.5 CAPSULE, EXTENDED RELEASE ORAL DAILY
Qty: 90 CAPSULE | Refills: 0 | Status: SHIPPED | OUTPATIENT
Start: 2023-12-29

## 2024-01-11 LAB — TSH SERPL DL<=0.005 MIU/L-ACNC: 0.66 UIU/ML (ref 0.45–4.5)

## 2024-02-07 DIAGNOSIS — F41.9 ANXIETY: ICD-10-CM

## 2024-02-07 RX ORDER — VENLAFAXINE HYDROCHLORIDE 75 MG/1
75 CAPSULE, EXTENDED RELEASE ORAL DAILY
Qty: 90 CAPSULE | Refills: 0 | Status: SHIPPED | OUTPATIENT
Start: 2024-02-07 | End: 2024-02-12

## 2024-02-11 DIAGNOSIS — F41.9 ANXIETY: ICD-10-CM

## 2024-02-12 RX ORDER — VENLAFAXINE HYDROCHLORIDE 75 MG/1
75 CAPSULE, EXTENDED RELEASE ORAL DAILY
Qty: 90 CAPSULE | Refills: 0 | Status: SHIPPED | OUTPATIENT
Start: 2024-02-12

## 2024-03-15 DIAGNOSIS — E03.8 OTHER SPECIFIED HYPOTHYROIDISM: ICD-10-CM

## 2024-03-15 RX ORDER — LEVOTHYROXINE SODIUM 0.07 MG/1
TABLET ORAL
Qty: 90 TABLET | Refills: 0 | Status: SHIPPED | OUTPATIENT
Start: 2024-03-15

## 2024-03-26 DIAGNOSIS — F41.9 ANXIETY: ICD-10-CM

## 2024-03-27 RX ORDER — VENLAFAXINE HYDROCHLORIDE 37.5 MG/1
37.5 CAPSULE, EXTENDED RELEASE ORAL DAILY
Qty: 90 CAPSULE | Refills: 3 | Status: SHIPPED | OUTPATIENT
Start: 2024-03-27

## 2024-06-05 DIAGNOSIS — E03.8 OTHER SPECIFIED HYPOTHYROIDISM: ICD-10-CM

## 2024-06-06 ENCOUNTER — APPOINTMENT (OUTPATIENT)
Dept: CT IMAGING | Facility: HOSPITAL | Age: 79
End: 2024-06-06
Payer: MEDICARE

## 2024-06-06 ENCOUNTER — APPOINTMENT (OUTPATIENT)
Dept: GENERAL RADIOLOGY | Facility: HOSPITAL | Age: 79
End: 2024-06-06
Payer: MEDICARE

## 2024-06-06 ENCOUNTER — HOSPITAL ENCOUNTER (EMERGENCY)
Facility: HOSPITAL | Age: 79
Discharge: HOME | End: 2024-06-06
Attending: EMERGENCY MEDICINE | Admitting: EMERGENCY MEDICINE
Payer: MEDICARE

## 2024-06-06 VITALS
WEIGHT: 145.5 LBS | OXYGEN SATURATION: 99 % | RESPIRATION RATE: 16 BRPM | SYSTOLIC BLOOD PRESSURE: 163 MMHG | HEART RATE: 70 BPM | HEIGHT: 67 IN | TEMPERATURE: 98.5 F | DIASTOLIC BLOOD PRESSURE: 92 MMHG | BODY MASS INDEX: 22.84 KG/M2

## 2024-06-06 DIAGNOSIS — R51.9 ACUTE NONINTRACTABLE HEADACHE, UNSPECIFIED HEADACHE TYPE: Primary | ICD-10-CM

## 2024-06-06 DIAGNOSIS — R03.0 ELEVATED BLOOD PRESSURE READING WITHOUT DIAGNOSIS OF HYPERTENSION: ICD-10-CM

## 2024-06-06 LAB
ANION GAP SERPL CALCULATED.3IONS-SCNC: 9.7 MMOL/L (ref 5–15)
BASOPHILS # BLD AUTO: 0.08 10*3/MM3 (ref 0–0.2)
BASOPHILS NFR BLD AUTO: 1.1 % (ref 0–1.5)
BUN SERPL-MCNC: 16 MG/DL (ref 8–23)
BUN/CREAT SERPL: 19.8 (ref 7–25)
CALCIUM SPEC-SCNC: 9.7 MG/DL (ref 8.6–10.5)
CHLORIDE SERPL-SCNC: 107 MMOL/L (ref 98–107)
CO2 SERPL-SCNC: 27.3 MMOL/L (ref 22–29)
CREAT SERPL-MCNC: 0.81 MG/DL (ref 0.57–1)
DEPRECATED RDW RBC AUTO: 46.7 FL (ref 37–54)
EGFRCR SERPLBLD CKD-EPI 2021: 74.4 ML/MIN/1.73
EOSINOPHIL # BLD AUTO: 0.28 10*3/MM3 (ref 0–0.4)
EOSINOPHIL NFR BLD AUTO: 4 % (ref 0.3–6.2)
ERYTHROCYTE [DISTWIDTH] IN BLOOD BY AUTOMATED COUNT: 13.7 % (ref 12.3–15.4)
GLUCOSE SERPL-MCNC: 87 MG/DL (ref 65–99)
HCT VFR BLD AUTO: 41.4 % (ref 34–46.6)
HGB BLD-MCNC: 13.7 G/DL (ref 12–15.9)
IMM GRANULOCYTES # BLD AUTO: 0.02 10*3/MM3 (ref 0–0.05)
IMM GRANULOCYTES NFR BLD AUTO: 0.3 % (ref 0–0.5)
LYMPHOCYTES # BLD AUTO: 2 10*3/MM3 (ref 0.7–3.1)
LYMPHOCYTES NFR BLD AUTO: 28.3 % (ref 19.6–45.3)
MCH RBC QN AUTO: 30.9 PG (ref 26.6–33)
MCHC RBC AUTO-ENTMCNC: 33.1 G/DL (ref 31.5–35.7)
MCV RBC AUTO: 93.2 FL (ref 79–97)
MONOCYTES # BLD AUTO: 0.81 10*3/MM3 (ref 0.1–0.9)
MONOCYTES NFR BLD AUTO: 11.5 % (ref 5–12)
NEUTROPHILS NFR BLD AUTO: 3.87 10*3/MM3 (ref 1.7–7)
NEUTROPHILS NFR BLD AUTO: 54.8 % (ref 42.7–76)
NRBC BLD AUTO-RTO: 0 /100 WBC (ref 0–0.2)
NT-PROBNP SERPL-MCNC: 93.4 PG/ML (ref 0–1800)
PLATELET # BLD AUTO: 336 10*3/MM3 (ref 140–450)
PMV BLD AUTO: 9.8 FL (ref 6–12)
POTASSIUM SERPL-SCNC: 3.7 MMOL/L (ref 3.5–5.2)
RBC # BLD AUTO: 4.44 10*6/MM3 (ref 3.77–5.28)
SODIUM SERPL-SCNC: 144 MMOL/L (ref 136–145)
TROPONIN T SERPL HS-MCNC: 7 NG/L
WBC NRBC COR # BLD AUTO: 7.06 10*3/MM3 (ref 3.4–10.8)

## 2024-06-06 PROCEDURE — 70450 CT HEAD/BRAIN W/O DYE: CPT

## 2024-06-06 PROCEDURE — 80048 BASIC METABOLIC PNL TOTAL CA: CPT | Performed by: EMERGENCY MEDICINE

## 2024-06-06 PROCEDURE — 99284 EMERGENCY DEPT VISIT MOD MDM: CPT

## 2024-06-06 PROCEDURE — 85025 COMPLETE CBC W/AUTO DIFF WBC: CPT | Performed by: EMERGENCY MEDICINE

## 2024-06-06 PROCEDURE — 84484 ASSAY OF TROPONIN QUANT: CPT | Performed by: EMERGENCY MEDICINE

## 2024-06-06 PROCEDURE — 71045 X-RAY EXAM CHEST 1 VIEW: CPT

## 2024-06-06 PROCEDURE — 93005 ELECTROCARDIOGRAM TRACING: CPT | Performed by: EMERGENCY MEDICINE

## 2024-06-06 PROCEDURE — 83880 ASSAY OF NATRIURETIC PEPTIDE: CPT | Performed by: EMERGENCY MEDICINE

## 2024-06-06 RX ORDER — LEVOTHYROXINE SODIUM 0.07 MG/1
TABLET ORAL
Qty: 90 TABLET | Refills: 0 | Status: SHIPPED | OUTPATIENT
Start: 2024-06-06

## 2024-06-06 NOTE — DISCHARGE INSTRUCTIONS
Keep a blood pressure diary checking blood pressure in the morning and afternoon or evening.  Follow-up with your primary provider or cardiologist in the next 1 to 2 weeks.  May use Tylenol or ibuprofen for headache.  Return any new or further problems.

## 2024-06-06 NOTE — ED PROVIDER NOTES
Subjective   History of Present Illness  78-year-old female presents with headache.  She states she has had this off and on for couple weeks.  She typically will wake up with it in the morning.  She has had no visual difficulty no arm or leg numbness or weakness.  She complained she has had a little bit of shortness of breath with exertion although she states she swam Monday with no difficulty she has had no chest pain.  She states she has been a little more anxious recently.  She states her blood pressure has been elevated at times other times it has been normal.  Review of Systems    Past Medical History:   Diagnosis Date    COVID 2022    Headache     Hypothyroidism     Radius fracture        Allergies   Allergen Reactions    Penicillins Hives    Sulfa Antibiotics Itching       Past Surgical History:   Procedure Laterality Date    EYE SURGERY      HERNIA REPAIR         Family History   Problem Relation Age of Onset    Stroke Mother     Parkinsonism Mother     Cancer Father         Pancreatic       Social History     Socioeconomic History    Marital status:     Years of education: post   Tobacco Use    Smoking status: Former     Current packs/day: 0.00     Average packs/day: 1 pack/day for 15.0 years (15.0 ttl pk-yrs)     Types: Cigarettes     Start date:      Quit date:      Years since quittin.4    Smokeless tobacco: Never    Tobacco comments:     40 yrs quit   Substance and Sexual Activity    Alcohol use: Yes     Comment: 4-5/week    Drug use: No    Sexual activity: Defer     Partners: Male     Prior to Admission medications    Medication Sig Start Date End Date Taking? Authorizing Provider   hydrOXYzine (ATARAX) 25 MG tablet TAKE 1 TABLET BY MOUTH EVERY 8 HOURS AS NEEDED FOR ITCHING 23   Jorge Vázquez MD   levothyroxine (SYNTHROID, LEVOTHROID) 75 MCG tablet Take 1 tablet by mouth once daily 24   Jorge Vázquez MD   magnesium oxide (MAGOX) 400 (241.3 Mg) MG tablet  tablet Take 1 tablet by mouth Daily.    Provider, MD Abbie   SUMAtriptan (IMITREX) 50 MG tablet Take one tablet at onset of headache. May repeat dose one time in 2 hours if headache not relieved. 2/28/19   Jorge Vázquez MD   venlafaxine XR (EFFEXOR-XR) 37.5 MG 24 hr capsule Take 1 capsule by mouth once daily 3/27/24   Jorge Vázquez MD   venlafaxine XR (EFFEXOR-XR) 75 MG 24 hr capsule Take 1 capsule by mouth once daily 2/12/24   Jorge Vázquez MD   levothyroxine (SYNTHROID, LEVOTHROID) 75 MCG tablet Take 1 tablet by mouth once daily 3/15/24 6/6/24  Jorge Vázquez MD            Objective   Physical Exam  78-year-old female awake alert.  Generally well-developed well-nourished.  Pupils equal round react light.  Extraocular muscles are intact visual fields full no facial asymmetry neck supple chest clear cardiovascular rhythm rate and rhythm abdomen soft nontender Neurologic exam cranial nerves II through XII grossly intact hands without pronator drift finger-nose intact speech clear motor or sensory intact to extremities without deficit.  NIH stroke scale 0  Procedures           ED Course      Results for orders placed or performed during the hospital encounter of 06/06/24   Basic Metabolic Panel    Specimen: Blood   Result Value Ref Range    Glucose 87 65 - 99 mg/dL    BUN 16 8 - 23 mg/dL    Creatinine 0.81 0.57 - 1.00 mg/dL    Sodium 144 136 - 145 mmol/L    Potassium 3.7 3.5 - 5.2 mmol/L    Chloride 107 98 - 107 mmol/L    CO2 27.3 22.0 - 29.0 mmol/L    Calcium 9.7 8.6 - 10.5 mg/dL    BUN/Creatinine Ratio 19.8 7.0 - 25.0    Anion Gap 9.7 5.0 - 15.0 mmol/L    eGFR 74.4 >60.0 mL/min/1.73   BNP    Specimen: Blood   Result Value Ref Range    proBNP 93.4 0.0 - 1,800.0 pg/mL   Single High Sensitivity Troponin T    Specimen: Blood   Result Value Ref Range    HS Troponin T 7 <14 ng/L   CBC Auto Differential    Specimen: Blood   Result Value Ref Range    WBC 7.06 3.40 - 10.80 10*3/mm3    RBC 4.44  "3.77 - 5.28 10*6/mm3    Hemoglobin 13.7 12.0 - 15.9 g/dL    Hematocrit 41.4 34.0 - 46.6 %    MCV 93.2 79.0 - 97.0 fL    MCH 30.9 26.6 - 33.0 pg    MCHC 33.1 31.5 - 35.7 g/dL    RDW 13.7 12.3 - 15.4 %    RDW-SD 46.7 37.0 - 54.0 fl    MPV 9.8 6.0 - 12.0 fL    Platelets 336 140 - 450 10*3/mm3    Neutrophil % 54.8 42.7 - 76.0 %    Lymphocyte % 28.3 19.6 - 45.3 %    Monocyte % 11.5 5.0 - 12.0 %    Eosinophil % 4.0 0.3 - 6.2 %    Basophil % 1.1 0.0 - 1.5 %    Immature Grans % 0.3 0.0 - 0.5 %    Neutrophils, Absolute 3.87 1.70 - 7.00 10*3/mm3    Lymphocytes, Absolute 2.00 0.70 - 3.10 10*3/mm3    Monocytes, Absolute 0.81 0.10 - 0.90 10*3/mm3    Eosinophils, Absolute 0.28 0.00 - 0.40 10*3/mm3    Basophils, Absolute 0.08 0.00 - 0.20 10*3/mm3    Immature Grans, Absolute 0.02 0.00 - 0.05 10*3/mm3    nRBC 0.0 0.0 - 0.2 /100 WBC   ECG 12 Lead Dyspnea   Result Value Ref Range    QT Interval 407 ms    QTC Interval 454 ms     CT Head Without Contrast    Result Date: 6/6/2024  Age-related changes of the brain as above, otherwise without evidence of acute intracranial abnormality. Electronically Signed: Torres Barboza MD  6/6/2024 2:47 PM EDT  Workstation ID: [id number]    XR Chest 1 View    Result Date: 6/6/2024  Impression: No acute chest finding. Electronically Signed: Martha Oviedo MD  6/6/2024 2:07 PM EDT  Workstation ID: [id number]   Medications - No data to display  /92   Pulse 70   Temp 98.5 °F (36.9 °C) (Oral)   Resp 16   Ht 170.2 cm (67\")   Wt 66 kg (145 lb 8.1 oz)   SpO2 99%   BMI 22.79 kg/m²                                            Medical Decision Making  Amount and/or Complexity of Data Reviewed  Labs: ordered.  Radiology: ordered.  ECG/medicine tests: ordered.    Chart review: Patient has had orthopedic evaluation for right thigh pain January this year.  Has had refills of medication from primary provider for anxiety and hypothyroid  Comorbidity: As per past history   Differential: Hypertension, left leg " abnormality, tumor,  My EKG interpretation: Sinus rhythm rate of 75 no previous for comparison  Lab: Normal BNP troponin CBC and basic metabolic panel  My Radiology review and interpretation: CT scan of head shows no evidence of acute intracranial abnormality.  There is some periventricular hypodensities consistent with small vessel microvascular disease.  There is some volume loss.  There is no evidence of mass or stroke or hemorrhage.  Discussion/treatment: Patient's findings were discussed with her.  Advised at this time would not initiate treatment of hypertension however would keep blood pressure diary and then could decide whether treatment is indicated.  Advised to follow-up with primary provider or cardiologist 1 to 2 weeks.  She can use Tylenol or ibuprofen for headache if needed.  Patient was evaluated using appropriate PPE      Final diagnoses:   Acute nonintractable headache, unspecified headache type   Elevated blood pressure reading without diagnosis of hypertension       ED Disposition  ED Disposition       ED Disposition   Discharge    Condition   Stable    Comment   --               Jorge Vázquez MD  [address] [place] [address] [phone]               Medication List      No changes were made to your prescriptions during this visit.            Lyndon Sewell MD  06/06/24 3477

## 2024-06-07 LAB
QT INTERVAL: 407 MS
QTC INTERVAL: 454 MS

## 2024-07-29 DIAGNOSIS — F41.9 ANXIETY: ICD-10-CM

## 2024-07-29 RX ORDER — VENLAFAXINE HYDROCHLORIDE 75 MG/1
75 CAPSULE, EXTENDED RELEASE ORAL DAILY
Qty: 90 CAPSULE | Refills: 0 | Status: SHIPPED | OUTPATIENT
Start: 2024-07-29

## 2024-08-02 ENCOUNTER — OFFICE VISIT (OUTPATIENT)
Dept: FAMILY MEDICINE CLINIC | Facility: CLINIC | Age: 79
End: 2024-08-02
Payer: MEDICARE

## 2024-08-02 VITALS
HEIGHT: 66 IN | SYSTOLIC BLOOD PRESSURE: 120 MMHG | BODY MASS INDEX: 23.46 KG/M2 | OXYGEN SATURATION: 98 % | DIASTOLIC BLOOD PRESSURE: 64 MMHG | RESPIRATION RATE: 16 BRPM | WEIGHT: 146 LBS | HEART RATE: 66 BPM

## 2024-08-02 DIAGNOSIS — L29.9 ITCHING: ICD-10-CM

## 2024-08-02 DIAGNOSIS — M25.562 CHRONIC PAIN OF BOTH KNEES: Primary | ICD-10-CM

## 2024-08-02 DIAGNOSIS — M25.561 CHRONIC PAIN OF BOTH KNEES: Primary | ICD-10-CM

## 2024-08-02 DIAGNOSIS — E78.2 MIXED HYPERLIPIDEMIA: ICD-10-CM

## 2024-08-02 DIAGNOSIS — G89.29 CHRONIC PAIN OF BOTH KNEES: Primary | ICD-10-CM

## 2024-08-02 DIAGNOSIS — E03.8 OTHER SPECIFIED HYPOTHYROIDISM: ICD-10-CM

## 2024-08-02 DIAGNOSIS — S76.111A STRAIN OF RIGHT QUADRICEPS, INITIAL ENCOUNTER: ICD-10-CM

## 2024-08-02 PROCEDURE — 99214 OFFICE O/P EST MOD 30 MIN: CPT | Performed by: FAMILY MEDICINE

## 2024-08-02 RX ORDER — HYDROXYZINE HYDROCHLORIDE 25 MG/1
25 TABLET, FILM COATED ORAL EVERY 8 HOURS PRN
Qty: 90 TABLET | Refills: 1 | Status: SHIPPED | OUTPATIENT
Start: 2024-08-02

## 2024-08-02 RX ORDER — METOPROLOL SUCCINATE 25 MG/1
1 TABLET, EXTENDED RELEASE ORAL DAILY
COMMUNITY
Start: 2024-07-25
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
## 2024-08-03 LAB
CHOLEST SERPL-MCNC: 214 MG/DL (ref 0–200)
HDLC SERPL-MCNC: 70 MG/DL (ref 40–60)
LDLC SERPL CALC-MCNC: 120 MG/DL (ref 0–100)
LDLC/HDLC SERPL: 1.66 {RATIO}
TRIGL SERPL-MCNC: 140 MG/DL (ref 0–150)
TSH SERPL DL<=0.005 MIU/L-ACNC: 0.42 UIU/ML (ref 0.27–4.2)
VLDLC SERPL CALC-MCNC: 24 MG/DL (ref 5–40)

## 2024-08-30 DIAGNOSIS — E03.8 OTHER SPECIFIED HYPOTHYROIDISM: ICD-10-CM

## 2024-08-30 RX ORDER — LEVOTHYROXINE SODIUM 75 UG/1
TABLET ORAL
Qty: 90 TABLET | Refills: 0 | Status: SHIPPED | OUTPATIENT
Start: 2024-08-30

## 2024-09-10 ENCOUNTER — TELEPHONE (OUTPATIENT)
Dept: ORTHOPEDIC SURGERY | Facility: CLINIC | Age: 79
End: 2024-09-10
Payer: MEDICARE

## 2024-09-17 ENCOUNTER — OFFICE VISIT (OUTPATIENT)
Dept: ORTHOPEDIC SURGERY | Facility: CLINIC | Age: 79
End: 2024-09-17
Payer: MEDICARE

## 2024-09-17 VITALS — WEIGHT: 147 LBS | BODY MASS INDEX: 23.07 KG/M2 | TEMPERATURE: 98.6 F | HEIGHT: 67 IN

## 2024-09-17 DIAGNOSIS — M47.816 OSTEOARTHRITIS OF LUMBAR SPINE, UNSPECIFIED SPINAL OSTEOARTHRITIS COMPLICATION STATUS: ICD-10-CM

## 2024-09-17 DIAGNOSIS — M16.11 PRIMARY OSTEOARTHRITIS OF RIGHT HIP: ICD-10-CM

## 2024-09-17 DIAGNOSIS — M21.061 ACQUIRED GENU VALGUM OF RIGHT KNEE: ICD-10-CM

## 2024-09-17 DIAGNOSIS — M17.0 PRIMARY OSTEOARTHRITIS OF BOTH KNEES: ICD-10-CM

## 2024-09-17 DIAGNOSIS — R52 PAIN: Primary | ICD-10-CM

## 2024-09-17 PROCEDURE — 99204 OFFICE O/P NEW MOD 45 MIN: CPT | Performed by: NURSE PRACTITIONER

## 2024-09-17 PROCEDURE — 73562 X-RAY EXAM OF KNEE 3: CPT | Performed by: NURSE PRACTITIONER

## 2024-09-17 PROCEDURE — 1160F RVW MEDS BY RX/DR IN RCRD: CPT | Performed by: NURSE PRACTITIONER

## 2024-09-17 PROCEDURE — 1159F MED LIST DOCD IN RCRD: CPT | Performed by: NURSE PRACTITIONER

## 2024-09-17 PROCEDURE — 72170 X-RAY EXAM OF PELVIS: CPT | Performed by: NURSE PRACTITIONER

## 2024-09-17 PROCEDURE — 20610 DRAIN/INJ JOINT/BURSA W/O US: CPT | Performed by: NURSE PRACTITIONER

## 2024-09-17 RX ORDER — METHYLPREDNISOLONE ACETATE 80 MG/ML
80 INJECTION, SUSPENSION INTRA-ARTICULAR; INTRALESIONAL; INTRAMUSCULAR; SOFT TISSUE
Status: COMPLETED | OUTPATIENT
Start: 2024-09-17 | End: 2024-09-17

## 2024-09-17 RX ADMIN — METHYLPREDNISOLONE ACETATE 80 MG: 80 INJECTION, SUSPENSION INTRA-ARTICULAR; INTRALESIONAL; INTRAMUSCULAR; SOFT TISSUE at 15:03

## 2024-09-19 ENCOUNTER — TELEPHONE (OUTPATIENT)
Dept: ORTHOPEDIC SURGERY | Facility: CLINIC | Age: 79
End: 2024-09-19

## 2024-09-19 NOTE — TELEPHONE ENCOUNTER
I have let mrs. Hendrix know. She also asked if you would refer to one of the surgeons for her knee.

## 2024-09-19 NOTE — TELEPHONE ENCOUNTER
Hub staff attempted to follow warm transfer process and was unsuccessful     Caller: Rita Hendrix    Relationship to patient: Self    Best call back number: [phone]    Patient is needing: PATIENT RECD CORTISONE INJ 9.17.24 AND WANTS TO KNOW IF SHE CAN GET FLU VAX, COVID VAX, PNEUMONIA VAX  HAS AN APPT  TODAY

## 2024-10-04 ENCOUNTER — OFFICE VISIT (OUTPATIENT)
Dept: ORTHOPEDIC SURGERY | Facility: CLINIC | Age: 79
End: 2024-10-04
Payer: MEDICARE

## 2024-10-04 VITALS — BODY MASS INDEX: 23.32 KG/M2 | TEMPERATURE: 97.5 F | HEIGHT: 67 IN | WEIGHT: 148.6 LBS

## 2024-10-04 DIAGNOSIS — M51.360 DEGENERATION OF INTERVERTEBRAL DISC OF LUMBAR REGION WITH DISCOGENIC BACK PAIN: Primary | ICD-10-CM

## 2024-10-04 PROCEDURE — 99213 OFFICE O/P EST LOW 20 MIN: CPT | Performed by: NURSE PRACTITIONER

## 2024-10-04 PROCEDURE — 1159F MED LIST DOCD IN RCRD: CPT | Performed by: NURSE PRACTITIONER

## 2024-10-04 PROCEDURE — 1160F RVW MEDS BY RX/DR IN RCRD: CPT | Performed by: NURSE PRACTITIONER

## 2024-10-04 RX ORDER — MULTIPLE VITAMINS W/ MINERALS TAB 9MG-400MCG
1 TAB ORAL DAILY
COMMUNITY

## 2024-10-04 NOTE — PROGRESS NOTES
Patient Name: Rita Hendrix   YOB: 1945  Referring Primary Care Physician: Jorge Vázquez MD      Chief Complaint:    Chief Complaint   Patient presents with    Lumbar Spine - Initial Evaluation        Previous Treatment:   [facility] per [facility]    PT for back pain?   No   If yes was it effective?     MRI of L-Spine?       Back Pain  This is a chronic problem. The current episode started more than 1 year ago. The problem occurs intermittently. The problem has been improved since onset. The pain is present in the lumbar spine. The pain radiates to the right anterolateral lower leg. The pain is at a severity of 0/10. The patient is experiencing no pain. Exacerbated by: walking. Associated symptoms include leg pain, numbness, tingling and weakness. Pertinent negatives include no bladder incontinence or bowel incontinence. She has tried chiropractic manipulation, ice, heat, bed rest and NSAIDs (CBD oil) for the symptoms. The treatment provided moderate relief.   Additional comments: 06/28/2021 - Dexa Scan ()        HPI:  Rita Hendrix is a 79 y.o. female who presents to [facility] for evaluation of back pain referring to right thigh at the request of SAMUEL Valencia.  Her primary concern is bilateral knee pain, but with thigh pain, it is prudent to ensure there is no nerve root impingement before contemplating knee replacement surgery.  She denies any injury associated with current symptoms.  Has not had therapy for this in the past.  She is very active and swims often.  She says that while swimming, this pain is not a problem.  This is an established patient to the practice, new to me.  She is accompanied by her  today.  Prior pertinent records were reviewed.    PFSH:  See attached    ROS: As per HPI, otherwise negative    Objective:      79 y.o. female  Body mass index is 23.27 kg/m²., 67.4 kg (148 lb 9.6 oz), @@  Vitals:    10/04/24 1418   Temp: 97.5 °F (36.4  °C)     Pain Score    10/04/24 1418   PainSc: 0-No pain   PainLoc: Back            Spine Musculoskeletal Exam    Gait    Valgus thrust: right    Palpation    Thoracolumbar    Right      Muscle tone: normal    Left      Muscle tone: normal    Strength    Thoracolumbar    Thoracolumbar motor exam is normal.       Sensory    Thoracolumbar    Thoracolumbar sensation is normal.    Reflexes    Right      Quadriceps: 1/4      Achilles: 1/4     Left      Quadriceps: 1/4      Achilles: 1/4    Special Tests    Thoracolumbar      Right      SLR: no back or leg pain      Left      SLR: no back or leg pain    General      Constitutional: well-developed and well-nourished    Scleral icterus: no    Labored breathing: no    Psychiatric: normal mood and affect and no acute distress    Neurological: alert and oriented x3    Skin: intact        IMAGING:     Indication: pain related symptoms,  Views: 2V AP&LAT lumbar  Findings: Disc degenerative change throughout, lower lumbar facet arthropathy, endplate edema L2-3, no obvious subluxation or fractures  Comparison views: None    Assessment:           Diagnoses and all orders for this visit:    1. Degeneration of intervertebral disc of lumbar region with discogenic back pain (Primary)             Plan:  For her pattern of pain referring to the right thigh, will get a lumbar MRI without contrast to rule out nerve root impingement.  Would be prudent to rule this out prior to contemplating a knee replacement surgery as it could delay recovery.  Will call her with the results of the MRI.  If there is anything of overt concern, we will either try conventional physical therapy, epidurals or referral to neurosurgery.  She understands and agrees with the plan.      Return for Call with results.    EMR Dragon/Transcription Disclaimer:   Much of this encounter note is an electronic transcription/translation of spoken language to printed text. The electronic translation of spoken language may  permit erroneous, or at times, nonsensical words or phrases to be inadvertently transcribed; Although I have reviewed the note for such errors, some may still exist.  Red flags have been discussed at this or previous visits to include but not limited weakness in extremities, worsening pain that does not respond to conservative treatment and bowel or bladder dysfunction. These are reasons to present to ER and patient has been informed.    The diagnosis(es), natural history, pathophysiology and treatment for diagnosis(es) were discussed. Opportunity given and questions answered. Biomechanics of pertinent body areas discussed.    EXERCISES:  Advice on benefits of, and types of regular/moderate exercise pertaining to diagnosis.  Continue HEP. For back or neck pain, recommend pilates and or yoga as tolerated. Generally it is best to start any new exercise under the guidance of a  or therapist.   MEDICATIONS:  When prescribe, the risks, benefits, warnings,side effects and alternatives of medications discussed. Advised against long term use of narcotics.   PAIN CONTROL:  Cold, heat, OTC lidocaine patches and/or ointment as needed. Avoid direct skin contact with ice. Ice 15-20 minutes 3-4 times daily as needed. For SI joint pain, recommend ice bath in water about 50 degrees for 5 consecutive days, add ice slowly to help with adjustment and may cover with warm towel or robe to help with cold tolerance. If using lidocaine, do not apply heat in conjunction as this can cause a burn.   MEDICAL RECORDS reviewed from other provider(s) for past and current medical history pertinent to this visit..

## 2024-10-19 ENCOUNTER — HOSPITAL ENCOUNTER (OUTPATIENT)
Dept: GENERAL RADIOLOGY | Facility: HOSPITAL | Age: 79
Discharge: HOME | End: 2024-10-19
Payer: MEDICARE

## 2024-10-19 ENCOUNTER — HOSPITAL ENCOUNTER (OUTPATIENT)
Dept: MRI IMAGING | Facility: HOSPITAL | Age: 79
Discharge: HOME | End: 2024-10-19
Payer: MEDICARE

## 2024-10-19 DIAGNOSIS — M51.360 DEGENERATION OF INTERVERTEBRAL DISC OF LUMBAR REGION WITH DISCOGENIC BACK PAIN: ICD-10-CM

## 2024-10-19 DIAGNOSIS — M79.5 FOREIGN BODY (FB) IN SOFT TISSUE: ICD-10-CM

## 2024-10-19 PROCEDURE — 72148 MRI LUMBAR SPINE W/O DYE: CPT

## 2024-10-19 PROCEDURE — 70030 X-RAY EYE FOR FOREIGN BODY: CPT

## 2024-10-21 ENCOUNTER — OFFICE VISIT (OUTPATIENT)
Dept: ORTHOPEDIC SURGERY | Facility: CLINIC | Age: 79
End: 2024-10-21
Payer: MEDICARE

## 2024-10-21 VITALS — BODY MASS INDEX: 23.35 KG/M2 | HEIGHT: 67 IN | WEIGHT: 148.8 LBS | TEMPERATURE: 98.7 F

## 2024-10-21 DIAGNOSIS — M16.11 ARTHRITIS OF RIGHT HIP: Primary | ICD-10-CM

## 2024-10-21 PROCEDURE — 1160F RVW MEDS BY RX/DR IN RCRD: CPT | Performed by: ORTHOPAEDIC SURGERY

## 2024-10-21 PROCEDURE — 99213 OFFICE O/P EST LOW 20 MIN: CPT | Performed by: ORTHOPAEDIC SURGERY

## 2024-10-21 PROCEDURE — 1159F MED LIST DOCD IN RCRD: CPT | Performed by: ORTHOPAEDIC SURGERY

## 2024-10-21 NOTE — PROGRESS NOTES
Discussed lumbar MRI results with the patient.  Very minimal arthritic changes and no high-grade central or foraminal narrowing.  She saw Dr. Ambrosio today and is going to proceed with hip injections.  May also contemplate knee replacement.  Back should not slow her down from either of these.  Follow-up as needed.

## 2024-10-21 NOTE — PROGRESS NOTES
Patient:Rita Hendrix    YOB: 1945    Medical Record Number:[id number]    Chief Complaints:  Right thigh pain    History of Present Illness:     79 y.o. female patient who presents for evaluation of a complaint of right thigh pain.  She has previously seen Sharyn Phillips and recently had an MRI of her lumbar spine.  She has continued to experience intermittent stabbing pains in her thigh, particularly when standing or walking.  She long history of knee osteoarthritis which she has periodically gotten injected.  The last few injections have not alleviated this intermittent thigh pain.  Denies any shooting pain down the leg.  Denies any numbness, tingling, weakness, bowel or bladder dysfunction.    Allergies   Allergen Reactions    Penicillins Hives    Sulfa Antibiotics Itching       Current Outpatient Medications:     hydrOXYzine (ATARAX) 25 MG tablet, Take 1 tablet by mouth Every 8 (Eight) Hours As Needed for Itching., Disp: 90 tablet, Rfl: 1    levothyroxine (SYNTHROID, LEVOTHROID) 75 MCG tablet, Take 1 tablet by mouth once daily, Disp: 90 tablet, Rfl: 0    magnesium oxide (MAGOX) 400 (241.3 Mg) MG tablet tablet, Take 1 tablet by mouth Daily., Disp: , Rfl:     metoprolol succinate XL (TOPROL-XL) 25 MG 24 hr tablet, Take 1 tablet by mouth Daily., Disp: , Rfl:     multivitamin with minerals tablet tablet, Take 1 tablet by mouth Daily., Disp: , Rfl:     SUMAtriptan (IMITREX) 50 MG tablet, Take one tablet at onset of headache. May repeat dose one time in 2 hours if headache not relieved., Disp: 27 tablet, Rfl: 3    venlafaxine XR (EFFEXOR-XR) 37.5 MG 24 hr capsule, Take 1 capsule by mouth once daily, Disp: 90 capsule, Rfl: 3    venlafaxine XR (EFFEXOR-XR) 75 MG 24 hr capsule, Take 1 capsule by mouth once daily, Disp: 90 capsule, Rfl: 0    vitamin D3 125 MCG (5000 UT) capsule capsule, Take 1 capsule by mouth Daily., Disp: , Rfl:     Past Medical History:   Diagnosis Date    COVID 12/2022    Headache      Hypothyroidism     Radius fracture        Past Surgical History:   Procedure Laterality Date    EYE SURGERY      HERNIA REPAIR         Social History     Occupational History    Occupation: Therapist   Tobacco Use    Smoking status: Former     Current packs/day: 0.00     Average packs/day: 1 pack/day for 15.0 years (15.0 ttl pk-yrs)     Types: Cigarettes     Start date:      Quit date:      Years since quittin.8     Passive exposure: Past    Smokeless tobacco: Never    Tobacco comments:     40 yrs quit   Vaping Use    Vaping status: Never Used   Substance and Sexual Activity    Alcohol use: Yes     Comment: 4-5/week    Drug use: Yes     Comment: THC gummies    Sexual activity: Defer     Partners: Male      Social History     Social History Narrative    Lives at home with        Family History   Problem Relation Age of Onset    Stroke Mother     Parkinsonism Mother     Cancer Father         Pancreatic       Review of Systems:      Constitutional: Denies fever, shaking or chills   Eyes: Denies change in visual acuity   HEENT: Denies nasal congestion or sore throat   Respiratory: Denies cough or shortness of breath   Cardiovascular: Denies chest pain or edema  Endocrine: Denies tremors, palpitations, intolerance of heat or cold, polyuria, polydipsia.  GI: Denies abdominal pain, nausea, vomiting, bloody stools or diarrhea  : Denies frequency, urgency, incontinence, retention, or nocturia.  Musculoskeletal: Denies numbness, tingling or loss of motor function except as above  Integument: Denies rash, lesion or ulceration   Neurologic: Denies headache or focal weakness, deficits  Heme: Denies spontaneous or excessive bleeding, epistaxis, hematuria, melena, fatigue, enlarged or tender lymph nodes.      All other pertinent positives and negatives as noted above in HPI.    Physical Exam:79 y.o. female  Vitals:    10/21/24 1410   Temp: 98.7 °F (37.1 °C)   TempSrc: Temporal   Weight: 67.5 kg (148 lb 12.8  "oz)   Height: 170.2 cm (67\")       General:  Patient is awake and alert.  Appears in no acute distress or discomfort.    Psych:  Affect and demeanor are appropriate.    Extremities: Right hip is examined.  Skin is benign.  No palpable swellings, masses, or adenopathy.  No focal areas of tenderness.  Hip motion is fairly symmetric to the contralateral side but rotation is uncomfortable.  Positive Stinchfield maneuver which exactly reproduces her typical pain.  No instability.  Good strength with hip flexion although it is uncomfortable.  Good strength with knee extension, ankle and toe plantarflexion and dorsiflexion.  Sensation is intact throughout the leg and foot.  Palpable pedal pulses.  Brisk capillary refill.     Imaging: AP pelvis x-ray from September 17 is reviewed.  She has bone-on-bone arthritic changes in the right hip with subchondral sclerosis and osteophyte formation    Assessment/Plan: End-stage right hip osteoarthritis    I suspect that her intermittent thigh pain is likely coming from the hip.  I suggested that an intra-articular injection may be helpful from both a diagnostic and therapeutic standpoint.  She was open to trying that.  I have ordered the fluoroscopy guided injection for her.  I told her to contact me a couple weeks after and let me know how it works out.  If it helps, we may need to refer her to one of my partners to discuss possible hip arthroplasty.  She is in agreement with that plan.    Clint Ambrosio MD    10/21/2024  "

## 2024-10-24 ENCOUNTER — TRANSCRIBE ORDERS (OUTPATIENT)
Dept: ADMINISTRATIVE | Facility: HOSPITAL | Age: 79
End: 2024-10-24
Payer: MEDICARE

## 2024-10-24 DIAGNOSIS — Z12.31 VISIT FOR SCREENING MAMMOGRAM: Primary | ICD-10-CM

## 2024-10-30 DIAGNOSIS — F41.9 ANXIETY: ICD-10-CM

## 2024-10-30 RX ORDER — VENLAFAXINE HYDROCHLORIDE 75 MG/1
75 CAPSULE, EXTENDED RELEASE ORAL DAILY
Qty: 90 CAPSULE | Refills: 0 | Status: SHIPPED | OUTPATIENT
Start: 2024-10-30

## 2024-11-01 ENCOUNTER — HOSPITAL ENCOUNTER (OUTPATIENT)
Dept: INTERVENTIONAL RADIOLOGY/VASCULAR | Facility: HOSPITAL | Age: 79
Discharge: HOME | End: 2024-11-01
Payer: MEDICARE

## 2024-11-01 DIAGNOSIS — M16.11 ARTHRITIS OF RIGHT HIP: ICD-10-CM

## 2024-11-01 PROCEDURE — 25010000002 LIDOCAINE 1 % SOLUTION: Performed by: ORTHOPAEDIC SURGERY

## 2024-11-01 PROCEDURE — 25510000001 IOPAMIDOL PER 1 ML: Performed by: ORTHOPAEDIC SURGERY

## 2024-11-01 PROCEDURE — 77002 NEEDLE LOCALIZATION BY XRAY: CPT

## 2024-11-01 PROCEDURE — 25010000002 METHYLPREDNISOLONE PER 80 MG: Performed by: ORTHOPAEDIC SURGERY

## 2024-11-01 PROCEDURE — 25010000002 BUPIVACAINE (PF) 0.25 % SOLUTION: Performed by: ORTHOPAEDIC SURGERY

## 2024-11-01 RX ORDER — LIDOCAINE HYDROCHLORIDE 10 MG/ML
10 INJECTION, SOLUTION INFILTRATION; PERINEURAL ONCE
Status: COMPLETED | OUTPATIENT
Start: 2024-11-01 | End: 2024-11-01

## 2024-11-01 RX ORDER — IOPAMIDOL 755 MG/ML
5 INJECTION, SOLUTION INTRAVASCULAR
Status: COMPLETED | OUTPATIENT
Start: 2024-11-01 | End: 2024-11-01

## 2024-11-01 RX ORDER — METHYLPREDNISOLONE ACETATE 80 MG/ML
80 INJECTION, SUSPENSION INTRA-ARTICULAR; INTRALESIONAL; INTRAMUSCULAR; SOFT TISSUE ONCE
Status: COMPLETED | OUTPATIENT
Start: 2024-11-01 | End: 2024-11-01

## 2024-11-01 RX ORDER — BUPIVACAINE HYDROCHLORIDE 2.5 MG/ML
5 INJECTION, SOLUTION EPIDURAL; INFILTRATION; INTRACAUDAL ONCE
Status: COMPLETED | OUTPATIENT
Start: 2024-11-01 | End: 2024-11-01

## 2024-11-01 RX ADMIN — BUPIVACAINE HYDROCHLORIDE 5 ML: 2.5 INJECTION, SOLUTION EPIDURAL; INFILTRATION; INTRACAUDAL; PERINEURAL at 14:34

## 2024-11-01 RX ADMIN — IOPAMIDOL 1 ML: 755 INJECTION, SOLUTION INTRAVENOUS at 14:38

## 2024-11-01 RX ADMIN — LIDOCAINE HYDROCHLORIDE 10 ML: 10 INJECTION, SOLUTION INFILTRATION; PERINEURAL at 14:37

## 2024-11-01 RX ADMIN — METHYLPREDNISOLONE ACETATE 80 MG: 80 INJECTION, SUSPENSION INTRA-ARTICULAR; INTRALESIONAL; INTRAMUSCULAR; SOFT TISSUE at 14:37

## 2024-11-21 ENCOUNTER — TELEPHONE (OUTPATIENT)
Dept: FAMILY MEDICINE CLINIC | Facility: CLINIC | Age: 79
End: 2024-11-21
Payer: MEDICARE

## 2024-11-21 ENCOUNTER — TELEPHONE (OUTPATIENT)
Dept: ORTHOPEDIC SURGERY | Facility: CLINIC | Age: 79
End: 2024-11-21

## 2024-11-21 ENCOUNTER — HOSPITAL ENCOUNTER (OUTPATIENT)
Dept: MAMMOGRAPHY | Facility: HOSPITAL | Age: 79
Discharge: HOME | End: 2024-11-21
Admitting: FAMILY MEDICINE
Payer: MEDICARE

## 2024-11-21 DIAGNOSIS — Z12.31 VISIT FOR SCREENING MAMMOGRAM: ICD-10-CM

## 2024-11-21 PROCEDURE — 77067 SCR MAMMO BI INCL CAD: CPT

## 2024-11-21 PROCEDURE — 77063 BREAST TOMOSYNTHESIS BI: CPT

## 2024-11-21 NOTE — TELEPHONE ENCOUNTER
Caller: Rita Hendrix    Relationship to patient: Self    Best call back number: [phone] (home)     Patient is needing: PATIENT HAD GUIDED INJECTION 11/1/24 THAT DR GALEAS ORDERED.   PATIENT STATES THAT ABOUT A WEEK AFTER THE INJECTION SHE GOT A FEW DAYS OF LITTLE TO NO PAIN, BUT IT HAS SINCE COME BACK.   SHE STATES THAT PAIN IS NOW COMING AND GOING, BUT REALLY THE SECOND WEEK AFTER WAS THE BEST PAIN-WISE.   SHE WAS TOLD TO LET DR GALEAS KNOW HOW THE INJECTION DID SO THAT HE COULD ADVISE ON NEXT STEPS.

## 2024-11-26 DIAGNOSIS — E03.8 OTHER SPECIFIED HYPOTHYROIDISM: ICD-10-CM

## 2024-12-11 RX ORDER — LEVOTHYROXINE SODIUM 75 UG/1
TABLET ORAL
Qty: 90 TABLET | Refills: 0 | Status: SHIPPED | OUTPATIENT
Start: 2024-12-11

## 2024-12-11 NOTE — TELEPHONE ENCOUNTER
Caller: [address], [place] [facility] [facility] [address] [phone] [phone] FX    Relationship: Pharmacy    Best call back number: [phone]    Requested Prescriptions:   Requested Prescriptions     Pending Prescriptions Disp Refills    levothyroxine (SYNTHROID, LEVOTHROID) 75 MCG tablet [Pharmacy Med Name: Levothyroxine Sodium 75 MCG Oral Tablet] 90 tablet 0     Sig: Take 1 tablet by mouth once daily        Pharmacy where request should be sent: [address], [place] - [facility] [facility] [address] [phone] [phone] FX     Last office visit with prescribing clinician: Visit date not found   Last telemedicine visit with prescribing clinician: Visit date not found   Next office visit with prescribing clinician: Visit date not found     Does the patient have less than a 3 day supply:  [] Yes  [x] No    Shavonne Thomas Rep   12/11/24 11:56 EST

## 2024-12-12 ENCOUNTER — OFFICE VISIT (OUTPATIENT)
Dept: ORTHOPEDIC SURGERY | Facility: CLINIC | Age: 79
End: 2024-12-12
Payer: MEDICARE

## 2024-12-12 VITALS — BODY MASS INDEX: 23.26 KG/M2 | TEMPERATURE: 97.6 F | HEIGHT: 67 IN | WEIGHT: 148.2 LBS

## 2024-12-12 DIAGNOSIS — M16.11 PRIMARY OSTEOARTHRITIS OF RIGHT HIP: Primary | ICD-10-CM

## 2024-12-12 DIAGNOSIS — R52 PAIN: ICD-10-CM

## 2024-12-12 PROCEDURE — 99214 OFFICE O/P EST MOD 30 MIN: CPT | Performed by: ORTHOPAEDIC SURGERY

## 2024-12-12 RX ORDER — PREGABALIN 75 MG/1
75 CAPSULE ORAL ONCE
OUTPATIENT
Start: 2024-12-12

## 2024-12-12 RX ORDER — LOSARTAN POTASSIUM 25 MG/1
TABLET ORAL
COMMUNITY
Start: 2024-12-09

## 2024-12-12 RX ORDER — ACETAMINOPHEN 325 MG/1
1000 TABLET ORAL ONCE
OUTPATIENT
Start: 2024-12-12 | End: 2024-12-12

## 2024-12-12 RX ORDER — MELOXICAM 7.5 MG/1
7.5 TABLET ORAL ONCE
OUTPATIENT
Start: 2024-12-12 | End: 2024-12-12

## 2024-12-12 RX ORDER — ACETAMINOPHEN 10 MG/ML
1000 INJECTION, SOLUTION INTRAVENOUS ONCE
OUTPATIENT
Start: 2024-12-12 | End: 2024-12-12

## 2024-12-12 NOTE — PROGRESS NOTES
Patient: Rita Hendrix    YOB: 1945    Medical Record Number: [id number]    Chief Complaints:  Right hip pain    History of Present Illness:     79 y.o. female patient who comes in today for evaluation of a new complaint of  righthip pain. Denies any discreet precipitating event or factor.   The pain is moderate,persistent and aching.  The pain is worse with prolonged standing or walking.  Rest helps.  Patient states she has had symptoms for over a year.  She has tried over-the-counter medications activity modification she does enjoy swimming.  But increased activity does worsen her symptoms.  She has history of some knee arthritis as well.  The hip pain is more anteriorly based in the hip and thigh region.  She did try hip joint injection on 1 November and helped just minimally.  Patient states the symptoms are now affecting his normal daily activities and overall quality life.    Denies any shooting pain down the leg, weakness, numbness or paresthesias.  Denies any fever shortness of breath.    Patient does see a cardiologist mainly for blood pressure issues.    Allergies:   Allergies   Allergen Reactions    Penicillins Hives    Sulfa Antibiotics Itching       Medications:     Home Medications:  Current Outpatient Medications on File Prior to Visit   Medication Sig Dispense Refill    hydrOXYzine (ATARAX) 25 MG tablet Take 1 tablet by mouth Every 8 (Eight) Hours As Needed for Itching. 90 tablet 1    levothyroxine (SYNTHROID, LEVOTHROID) 75 MCG tablet Take 1 tablet by mouth once daily 90 tablet 0    losartan (COZAAR) 25 MG tablet       magnesium oxide (MAGOX) 400 (241.3 Mg) MG tablet tablet Take 1 tablet by mouth Daily.      metoprolol succinate XL (TOPROL-XL) 25 MG 24 hr tablet Take 1 tablet by mouth Daily.      multivitamin with minerals tablet tablet Take 1 tablet by mouth Daily.      SUMAtriptan (IMITREX) 50 MG tablet Take one tablet at onset of headache. May repeat dose one time in 2 hours if  "headache not relieved. 27 tablet 3    venlafaxine XR (EFFEXOR-XR) 37.5 MG 24 hr capsule Take 1 capsule by mouth once daily 90 capsule 3    venlafaxine XR (EFFEXOR-XR) 75 MG 24 hr capsule Take 1 capsule by mouth once daily 90 capsule 0    vitamin D3 125 MCG (5000 UT) capsule capsule Take 1 capsule by mouth Daily.       No current facility-administered medications on file prior to visit.     Past Medical History:   Diagnosis Date    COVID 2022    Headache     Hypothyroidism     Radius fracture      Past Surgical History:   Procedure Laterality Date    EYE SURGERY      HERNIA REPAIR       Social History     Occupational History    Occupation: Therapist   Tobacco Use    Smoking status: Former     Current packs/day: 0.00     Average packs/day: 1 pack/day for 15.0 years (15.0 ttl pk-yrs)     Types: Cigarettes     Start date:      Quit date:      Years since quittin.9     Passive exposure: Past    Smokeless tobacco: Never    Tobacco comments:     40 yrs quit   Vaping Use    Vaping status: Never Used   Substance and Sexual Activity    Alcohol use: Yes     Comment: 4-5/week    Drug use: Yes     Comment: THC gummies    Sexual activity: Defer     Partners: Male      Social History     Social History Narrative    Lives at home with      Family History   Problem Relation Age of Onset    Stroke Mother     Parkinsonism Mother     Cancer Father         Pancreatic       Review of Systems:      Constitutional: Denies fever, shaking or chills         All other pertinent positives and negatives as noted above in HPI.    Physical Exam: 79 y.o. female  Vitals:    24 1343   Temp: 97.6 °F (36.4 °C)   TempSrc: Temporal   Weight: 67.2 kg (148 lb 3.2 oz)   Height: 170.2 cm (67\")     General:  Patient is awake and alert.  Appears in no acute distress or discomfort.        Right lower extremity:  Skin is benign.  No palpable masses or adenopathy.  No focal area of tenderness.  Hip motion is limited and " uncomfortable.  Positive Stinchfield maneuver.  Good strength with hip flexion, extension, abduction.  Good strength distally with plantarflexion and dorsiflexion of ankle, toes.  Sensation to light touch intact distally in the lower leg and foot.  Good pedal pulses with brisk cap refill.    Radiology: AP pelvis, AP and lateral views of the right hip are ordered by myself and reviewed to evaluate the patient's complaint.  The x-rays show severe hip osteoarthritis with joint space narrowing, subchondral sclerosis and osteophyte formation.  There are no obvious acute abnormalities, lesions, masses, or other concerning findings.    Comparison films todays xrays were compared to previous xrays and demonstrate no change    Assessment:  Right hip osteoarthritis      Plan:    I had a lengthy discussion with the patient regarding options, both surgical and non-surgical.  Patient has tried multiple conservative treatment options as noted above and symptoms are continuing to affect her normal daily activities and overall quality life.  At this time I do think she would benefit from a total hip replacement surgery.  I did discuss surgery in detail with the patient her  including use of a model as well as risk and benefits.   I did discuss risk and benefits with the patient with risk including but not limited to bleeding, infection, damage to nearby nerves, vessels, tendons, ligaments, continued pain, worsening pain, fracture, dislocation, leg length discrepancy, blood clots, even death with anesthesia and possible need for future procedures surgeries.  Patient understood this and has chosen to proceed.      Plan to proceed with right total hip replacement    Patient will require cardiac clearance    Can plan for outpatient surgery.    Questions answered      Alphonse Messina MD    12/12/2024    CC to Tammi Sidhu MD

## 2024-12-17 DIAGNOSIS — E03.8 OTHER SPECIFIED HYPOTHYROIDISM: ICD-10-CM

## 2024-12-17 RX ORDER — LEVOTHYROXINE SODIUM 75 UG/1
TABLET ORAL
Qty: 90 TABLET | Refills: 0 | OUTPATIENT
Start: 2024-12-17

## 2024-12-19 ENCOUNTER — TELEPHONE (OUTPATIENT)
Dept: ORTHOPEDIC SURGERY | Facility: CLINIC | Age: 79
End: 2024-12-19
Payer: MEDICARE

## 2024-12-19 NOTE — TELEPHONE ENCOUNTER
Caller: Rita Hendrix    Relationship to patient: Self    Best call back number: [phone]    Chief complaint: RIGHT HIP     Type of visit: SURGERY    Requested date: NEXT AVAILABLE (ASKED TO SPEAK TO THUY)  If rescheduling, when is the original appointment: 1-17-24

## 2024-12-19 NOTE — TELEPHONE ENCOUNTER
Call returned to the patient.  She needs to reschedule her surgery to sometime after just January 20 due to a prior commitment that she had forgotten about.  Have advised her that right now first available I have could be either February 21 if the operating room has available time or the next available would be March 5.  Patient is agreeable to either date

## 2025-01-15 ENCOUNTER — OFFICE VISIT (OUTPATIENT)
Dept: INTERNAL MEDICINE | Facility: CLINIC | Age: 80
End: 2025-01-15
Payer: MEDICARE

## 2025-01-15 VITALS
RESPIRATION RATE: 18 BRPM | SYSTOLIC BLOOD PRESSURE: 108 MMHG | BODY MASS INDEX: 23.54 KG/M2 | HEART RATE: 69 BPM | HEIGHT: 67 IN | OXYGEN SATURATION: 97 % | DIASTOLIC BLOOD PRESSURE: 60 MMHG | WEIGHT: 150 LBS

## 2025-01-15 DIAGNOSIS — F41.9 ANXIETY: ICD-10-CM

## 2025-01-15 DIAGNOSIS — E03.8 OTHER SPECIFIED HYPOTHYROIDISM: Primary | ICD-10-CM

## 2025-01-15 DIAGNOSIS — M16.11 PRIMARY OSTEOARTHRITIS OF RIGHT HIP: ICD-10-CM

## 2025-01-15 PROCEDURE — 99213 OFFICE O/P EST LOW 20 MIN: CPT | Performed by: STUDENT IN AN ORGANIZED HEALTH CARE EDUCATION/TRAINING PROGRAM

## 2025-01-15 PROCEDURE — 1125F AMNT PAIN NOTED PAIN PRSNT: CPT | Performed by: STUDENT IN AN ORGANIZED HEALTH CARE EDUCATION/TRAINING PROGRAM

## 2025-01-15 NOTE — PROGRESS NOTES
"Chief Complaint  [facility]    Subjective        Rita Hendrix presents to [facility]  History of Present Illness  This is a 79-year-old female with chronic medical conditions of hypertension, osteoarthritis, anxiety and hypothyroidism who presents to [facility].    Hypertension: This is well-controlled on 25 mg metoprolol succinate XL and 25 mg losartan.  Anxiety: She is maintained on 75 mg venlafaxine XR  Hypothyroidism: She is maintained on 75 mcg levothyroxine.  Migraine disorder: She is prescribed Imitrex 50 mg; denies needing this medication frequently.  Osteoarthritis: Noted plans for right hip surgery in March of this year with Dr. Messina.    Objective   Vital Signs:  /60 (BP Location: Left arm, Patient Position: Sitting, Cuff Size: Adult)   Pulse 69   Resp 18   Ht 170.2 cm (67.01\")   Wt 68 kg (150 lb)   SpO2 97%   BMI 23.49 kg/m²   Estimated body mass index is 23.49 kg/m² as calculated from the following:    Height as of this encounter: 170.2 cm (67.01\").    Weight as of this encounter: 68 kg (150 lb).    BMI is within normal parameters. No other follow-up for BMI required.      Physical Exam  Vitals reviewed.   Constitutional:       General: She is not in acute distress.     Appearance: Normal appearance.   Cardiovascular:      Rate and Rhythm: Normal rate and regular rhythm.      Heart sounds: No murmur heard.  Pulmonary:      Effort: Pulmonary effort is normal. No respiratory distress.      Breath sounds: Normal breath sounds.   Skin:     General: Skin is warm and dry.   Neurological:      Mental Status: She is alert and oriented to person, place, and time.   Psychiatric:         Mood and Affect: Mood normal.         Thought Content: Thought content normal.         Judgment: Judgment normal.        Result Review :  The following data was reviewed by: Tammi Sidhu MD on 01/15/2025:  Common labs          6/6/2024    13:51 8/2/2024    15:40   Common " Labs   Glucose 87     BUN 16     Creatinine 0.81     Sodium 144     Potassium 3.7     Chloride 107     Calcium 9.7     WBC 7.06     Hemoglobin 13.7     Hematocrit 41.4     Platelets 336     Total Cholesterol  214    Triglycerides  140    HDL Cholesterol  70    LDL Cholesterol   120                Assessment and Plan   Diagnoses and all orders for this visit:    1. Other specified hypothyroidism (Primary)  -     TSH Rfx On Abnormal To Free T4; Future  -     CBC (No Diff); Future  -     Comprehensive Metabolic Panel; Future    2. Primary osteoarthritis of right hip    3. Anxiety      Plan check routine labs with Medicare wellness visit in 6 months.       Follow Up   Return in about 6 months (around 7/15/2025) for Medicare Wellness.  Patient was given instructions and counseling regarding her condition or for health maintenance advice. Please see specific information pulled into the AVS if appropriate.

## 2025-01-16 ENCOUNTER — TELEPHONE (OUTPATIENT)
Dept: ORTHOPEDIC SURGERY | Facility: CLINIC | Age: 80
End: 2025-01-16
Payer: MEDICARE

## 2025-01-16 NOTE — TELEPHONE ENCOUNTER
Provider: APOLLO    Caller: Rita Hendrix    Relationship to Patient: Self    Pharmacy:     Phone Number: [phone]    Reason for Call: PT CALLED TO ASK IF DR BUSTILLOS THOUGHT IT WOULD BE A GOOD IDEA TO HAVE P.T. BEFORE HAVING HER SX SO SHE CAN BE USED TO IT FOR AFTER HER SX OR IF HE THOUGHT IT WOULD CAUSE MORE DAMAGE

## 2025-01-17 NOTE — TELEPHONE ENCOUNTER
Tried to call patient and left voicemail stating that Dr. Messina is fine with Ms. Hendrix doing some prehab before surgery. Left office number for patient to call back so she can tell me where she would like to go for prehab.

## 2025-01-25 DIAGNOSIS — F41.9 ANXIETY: ICD-10-CM

## 2025-01-27 RX ORDER — VENLAFAXINE HYDROCHLORIDE 75 MG/1
75 CAPSULE, EXTENDED RELEASE ORAL DAILY
Qty: 90 CAPSULE | Refills: 0 | Status: SHIPPED | OUTPATIENT
Start: 2025-01-27

## 2025-02-20 ENCOUNTER — PRE-ADMISSION TESTING (OUTPATIENT)
Dept: PREADMISSION TESTING | Facility: HOSPITAL | Age: 80
End: 2025-02-20
Payer: MEDICARE

## 2025-02-20 VITALS
BODY MASS INDEX: 23.49 KG/M2 | HEART RATE: 95 BPM | TEMPERATURE: 97.7 F | HEIGHT: 67 IN | DIASTOLIC BLOOD PRESSURE: 77 MMHG | OXYGEN SATURATION: 98 % | WEIGHT: 149.7 LBS | RESPIRATION RATE: 18 BRPM | SYSTOLIC BLOOD PRESSURE: 122 MMHG

## 2025-02-20 DIAGNOSIS — M16.11 PRIMARY OSTEOARTHRITIS OF RIGHT HIP: ICD-10-CM

## 2025-02-20 LAB
ABO GROUP BLD: NORMAL
ANION GAP SERPL CALCULATED.3IONS-SCNC: 11.3 MMOL/L (ref 5–15)
BLD GP AB SCN SERPL QL: NEGATIVE
BUN SERPL-MCNC: 20 MG/DL (ref 8–23)
BUN/CREAT SERPL: 25.6 (ref 7–25)
CALCIUM SPEC-SCNC: 9.4 MG/DL (ref 8.6–10.5)
CHLORIDE SERPL-SCNC: 102 MMOL/L (ref 98–107)
CO2 SERPL-SCNC: 25.7 MMOL/L (ref 22–29)
CREAT SERPL-MCNC: 0.78 MG/DL (ref 0.57–1)
DEPRECATED RDW RBC AUTO: 43.4 FL (ref 37–54)
EGFRCR SERPLBLD CKD-EPI 2021: 77.4 ML/MIN/1.73
ERYTHROCYTE [DISTWIDTH] IN BLOOD BY AUTOMATED COUNT: 12.7 % (ref 12.3–15.4)
GLUCOSE SERPL-MCNC: 122 MG/DL (ref 65–99)
HBA1C MFR BLD: 5.6 % (ref 4.8–5.6)
HCT VFR BLD AUTO: 44.5 % (ref 34–46.6)
HGB BLD-MCNC: 15.1 G/DL (ref 12–15.9)
INR PPP: 1.03 (ref 0.9–1.1)
MCH RBC QN AUTO: 31.8 PG (ref 26.6–33)
MCHC RBC AUTO-ENTMCNC: 33.9 G/DL (ref 31.5–35.7)
MCV RBC AUTO: 93.7 FL (ref 79–97)
PLATELET # BLD AUTO: 370 10*3/MM3 (ref 140–450)
PMV BLD AUTO: 9.9 FL (ref 6–12)
POTASSIUM SERPL-SCNC: 3.8 MMOL/L (ref 3.5–5.2)
PROTHROMBIN TIME: 13.5 SECONDS (ref 11.7–14.2)
RBC # BLD AUTO: 4.75 10*6/MM3 (ref 3.77–5.28)
RH BLD: POSITIVE
SODIUM SERPL-SCNC: 139 MMOL/L (ref 136–145)
T&S EXPIRATION DATE: NORMAL
WBC NRBC COR # BLD AUTO: 8.2 10*3/MM3 (ref 3.4–10.8)

## 2025-02-20 PROCEDURE — 86850 RBC ANTIBODY SCREEN: CPT

## 2025-02-20 PROCEDURE — 83036 HEMOGLOBIN GLYCOSYLATED A1C: CPT

## 2025-02-20 PROCEDURE — 36415 COLL VENOUS BLD VENIPUNCTURE: CPT

## 2025-02-20 PROCEDURE — 86901 BLOOD TYPING SEROLOGIC RH(D): CPT

## 2025-02-20 PROCEDURE — 86900 BLOOD TYPING SEROLOGIC ABO: CPT

## 2025-02-20 PROCEDURE — 85610 PROTHROMBIN TIME: CPT

## 2025-02-20 PROCEDURE — 85027 COMPLETE CBC AUTOMATED: CPT

## 2025-02-20 PROCEDURE — 80048 BASIC METABOLIC PNL TOTAL CA: CPT

## 2025-02-20 NOTE — DISCHARGE INSTRUCTIONS
Take the following medications the morning of surgery: METOPROLOL AND LEVOTHYROXINE AND EFFEXOR      If you are on prescription narcotic pain medication to control your pain you may also take that medication the morning of surgery.      General Instructions:     Do not eat solid food after midnight the night before surgery.  Clear liquids day of surgery are allowed but must be stopped at least two hours before your hospital arrival time.       Allowed clear liquids      Water, sodas, and tea or coffee with no cream or milk added.       12 to 20 ounces of a clear liquid that contains carbohydrates is recommended.  If non-diabetic, have Gatorade or Powerade.  If diabetic, have G2 or Powerade Zero.     Do not have liquids red in color.  Do not consume chicken, beef, pork or vegetable broth or bouillon cubes of any variety as they are not considered clear liquids and are not allowed.      Infants may have breast milk up to four hours before surgery.  Infants drinking formula may drink formula up to six hours before surgery.   Patients who avoid smoking, chewing tobacco and alcohol for 4 weeks prior to surgery have a reduced risk of post-operative complications.  Quit smoking as many days before surgery as you can.  Do not smoke, use chewing tobacco or drink alcohol the day of surgery.   If applicable bring your C-PAP/ BI-PAP machine in with you to preop day of surgery.  Bring any papers given to you in the doctor’s office.  Wear clean comfortable clothes.  Do not wear contact lenses, false eyelashes or make-up.  Bring a case for your glasses.   Bring crutches or walker if applicable.  Remove all piercings.  Leave jewelry and any other valuables at home.  Hair extensions with metal clips must be removed prior to surgery.  The Pre-Admission Testing nurse will instruct you to bring medications if unable to obtain an accurate list in Pre-Admission Testing.    Day of surgery you will need to let the preoperative nurse know  the last time you took each of your medications.      If you were given a blood bank ID arm band remember to bring it with you the day of surgery.    Preventing a Surgical Site Infection:  For 2 to 3 days before surgery, avoid shaving with a razor because the razor can irritate skin and make it easier to develop an infection.    Any areas of open skin can increase the risk of a post-operative wound infection by allowing bacteria to enter and travel throughout the body.  Notify your surgeon if you have any skin wounds / rashes even if it is not near the expected surgical site.  The area will need assessed to determine if surgery should be delayed until it is healed.  The night prior to surgery shower using a fresh bar of anti-bacterial soap (such as Dial) and clean washcloth.  Sleep in a clean bed with clean clothing.  Do not allow pets to sleep with you.  Shower on the morning of surgery using a fresh bar of anti-bacterial soap (such as Dial) and clean washcloth.  Dry with a clean towel and dress in clean clothing.  Ask your surgeon if you will be receiving antibiotics prior to surgery.  Make sure you, your family, and all healthcare providers clean their hands with soap and water or an alcohol based hand  before caring for you or your wound.  CHLORHEXIDINE CLOTH INSTRUCTIONS  The morning of surgery follow these instructions using the Chlorhexidine cloths you've been given.  These steps reduce bacteria on the body.  Do not use the cloths near your eyes, ears mouth, genitalia or on open wounds.  Throw the cloths away after use but do not try to flush them down a toilet.      Open and remove one cloth at a time from the package.    Leave the cloth unfolded and begin the bathing.  Massage the skin with the cloths using gentle pressure to remove bacteria.  Do not scrub harshly.   Follow the steps below with one 2% CHG cloth per area (6 total cloths).  One cloth for neck, shoulders and chest.  One cloth for both  arms, hands, fingers and underarms (do underarms last).  One cloth for the abdomen followed by groin.  One cloth for right leg and foot including between the toes.  One cloth for left leg and foot including between the toes.  The last cloth is to be used for the back of the neck, back and buttocks.    Allow the CHG to air dry 3 minutes on the skin which will give it time to work and decrease the chance of irritation.  The skin may feel sticky until it is dry.  Do not rinse with water or any other liquid or you will lose the beneficial effects of the CHG.  If mild skin irritation occurs, do rinse the skin to remove the CHG.  Report this to the nurse at time of admission.  Do not apply lotions, creams, ointments, deodorants or perfumes after using the clothes. Dress in clean clothes before coming to the hospital.  Day of surgery:  Your arrival time is approximately two hours before your scheduled surgery time.  Please note if you have an early arrival time the surgery doors do not open before 5:00 AM.  Upon arrival, a Pre-op nurse and Anesthesiologist will review your health history, obtain vital signs, and answer questions you may have.  The only belongings needed at this time will be a list of your home medications and if applicable your C-PAP/BI-PAP machine.  A Pre-op nurse will start an IV and you may receive medication in preparation for surgery, including something to help you relax.     Please be aware that surgery does come with discomfort.  We want to make every effort to control your discomfort so please discuss any uncontrolled symptoms with your nurse.   Your doctor will most likely have prescribed pain medications.      If you are going home after surgery you will receive individualized written care instructions before being discharged.  A responsible adult must drive you to and from the hospital on the day of your surgery and ideally stay with you through the night.   .  Discharge prescriptions can be  filled by the hospital pharmacy during regular pharmacy hours.  If you are having surgery late in the day/evening your prescription may be e-prescribed to your pharmacy.  Please verify your pharmacy hours or chose a 24 hour pharmacy to avoid not having access to your prescription because your pharmacy has closed for the day.    If you are staying overnight following surgery, you will be transported to your hospital room following the recovery period.  [facility] has all private rooms.    If you have any questions please call Pre-Admission Testing at [phone].  Deductibles and co-payments are collected on the day of service. Please be prepared to pay the required co-pay, deductible or deposit on the day of service as defined by your plan.    Call your surgeon immediately if you experience any of the following symptoms:  Sore Throat  Shortness of Breath or difficulty breathing  Cough  Chills  Body soreness or muscle pain  Headache  Fever  New loss of taste or smell  Do not arrive for your surgery ill.  Your procedure will need to be rescheduled to another time.  You will need to call your physician before the day of surgery to avoid any unnecessary exposure to hospital staff as well as other patients.

## 2025-02-24 ENCOUNTER — TELEPHONE (OUTPATIENT)
Dept: ORTHOPEDIC SURGERY | Facility: HOSPITAL | Age: 80
End: 2025-02-24
Payer: MEDICARE

## 2025-02-24 NOTE — TELEPHONE ENCOUNTER
Called and spoke with Ms. Hendrix as the note stated she was going home after her upcoming procedure 3/5. She said she has been thinking about it and speaking with some friends and she is leaning more towards staying one night. She wants to make sure she has good pain control and off the anesthesia before going home with her . She has an appointment with the MD 2/27 to discuss further. Ms. Hendrix is to be an overnight stay at this time.

## 2025-02-27 ENCOUNTER — OFFICE VISIT (OUTPATIENT)
Dept: ORTHOPEDIC SURGERY | Facility: CLINIC | Age: 80
End: 2025-02-27
Payer: MEDICARE

## 2025-02-27 VITALS — BODY MASS INDEX: 23.95 KG/M2 | HEIGHT: 66 IN | TEMPERATURE: 98.2 F | WEIGHT: 149 LBS

## 2025-02-27 DIAGNOSIS — Z96.641 S/P TOTAL RIGHT HIP ARTHROPLASTY: Primary | ICD-10-CM

## 2025-02-27 DIAGNOSIS — M16.11 PRIMARY OSTEOARTHRITIS OF RIGHT HIP: ICD-10-CM

## 2025-02-27 DIAGNOSIS — M16.11 PRIMARY OSTEOARTHRITIS OF RIGHT HIP: Primary | ICD-10-CM

## 2025-02-27 NOTE — PROGRESS NOTES
Patient: Rita Hendrix        YOB: 1945    Medical Record Number: [id number]    Admitting Physician: Dr. Alphonse Messina    Reason for Admission: End Stage Right Hip OA    History of Present Illness: 79 y.o. female presents with severe end stage hip osteoarthritis which has not been responsive to the full complement of conservative measures. Despite conservative attempts, there is still severe, constant activity limiting hip pain. Given the severity of the pain, the patient has elected to proceed with hip replacement.    Allergies:   Allergies   Allergen Reactions    Penicillins Hives    Sulfa Antibiotics Itching         Current Medications:  Home Medications:    Current Outpatient Medications on File Prior to Visit   Medication Sig    hydrOXYzine (ATARAX) 25 MG tablet Take 1 tablet by mouth Every 8 (Eight) Hours As Needed for Itching.    levothyroxine (SYNTHROID, LEVOTHROID) 75 MCG tablet Take 1 tablet by mouth once daily (Patient taking differently: Take 1 tablet by mouth Every Morning.)    losartan (COZAAR) 25 MG tablet Take 1 tablet by mouth Every Night. PT TO HOLD 24 HOUR PRIOR TO SURGERY    magnesium oxide (MAGOX) 400 (241.3 Mg) MG tablet tablet Take 1 tablet by mouth Daily. HELD    metoprolol succinate XL (TOPROL-XL) 25 MG 24 hr tablet Take 1 tablet by mouth Daily.    multivitamin with minerals tablet tablet Take 1 tablet by mouth Daily. HELD    SUMAtriptan (IMITREX) 50 MG tablet Take one tablet at onset of headache. May repeat dose one time in 2 hours if headache not relieved. (Patient taking differently: Take 1 tablet by mouth Every 2 (Two) Hours As Needed for Migraine. Take one tablet at onset of headache. May repeat dose one time in 2 hours if headache not relieved.)    venlafaxine XR (EFFEXOR-XR) 37.5 MG 24 hr capsule Take 1 capsule by mouth once daily    venlafaxine XR (EFFEXOR-XR) 75 MG 24 hr capsule Take 1 capsule by mouth once daily    vitamin D3 125 MCG (5000 UT) capsule capsule  "Take 1 capsule by mouth Daily. HELD     No current facility-administered medications on file prior to visit.     PRN Meds:.    PMH:  Past Medical History:   Diagnosis Date    Anxiety     Arthritis     COVID 2022    Headache     Hip pain     [place] (hard of hearing)     Hypertension     Hypothyroidism     Migraines     Radius fracture         PSURGH:  Past Surgical History:   Procedure Laterality Date    EYE SURGERY      HERNIA REPAIR         SocialHx:  Social History     Occupational History    Occupation: Therapist   Tobacco Use    Smoking status: Former     Current packs/day: 0.00     Average packs/day: 1 pack/day for 15.0 years (15.0 ttl pk-yrs)     Types: Cigarettes     Start date:      Quit date:      Years since quittin.1     Passive exposure: Past    Smokeless tobacco: Never    Tobacco comments:     40 yrs quit   Vaping Use    Vaping status: Never Used   Substance and Sexual Activity    Alcohol use: Yes     Comment: 4-5/week    Drug use: Yes     Types: Marijuana     Comment: STATED HAS A LETTER TO USE FROM DR GONZALEZ    Sexual activity: Defer     Partners: Male      Social History     Social History Narrative    Lives at home with        FamHx:  Family History   Problem Relation Age of Onset    Stroke Mother     Parkinsonism Mother     Cancer Father         Pancreatic    Malig Hyperthermia Neg Hx          Review of Systems:   A 14 point review of systems was performed, pertinent positives discussed above, all other systems are negative    Physical Exam: 79 y.o. female  Vital Signs :   Vitals:    25 1136   Temp: 98.2 °F (36.8 °C)   TempSrc: Temporal   Weight: 67.6 kg (149 lb)   Height: 168.9 cm (66.5\")   PainSc: 5    PainLoc: Hip     General: Alert and Oriented x 3, No acute distress.  Psych: mood and affect appropriate; recent and remote memory intact  Eyes: conjunctivae clear; pupils equally round and reactive, Sclerae anicteric  CV: no peripheral edema  Resp: normal respiratory " effort  Skin: no rashes or wounds; normal turgor  Musculosketetal; pain with hip range of motion. Positive stinchfeld test. No trochanteric  Tenderness.  Vascular: palpable distal pulses    Labs:    Pre-Admission Testing on 02/20/2025   Component Date Value Ref Range Status    WBC 02/20/2025 8.20  3.40 - 10.80 10*3/mm3 Final    RBC 02/20/2025 4.75  3.77 - 5.28 10*6/mm3 Final    Hemoglobin 02/20/2025 15.1  12.0 - 15.9 g/dL Final    Hematocrit 02/20/2025 44.5  34.0 - 46.6 % Final    MCV 02/20/2025 93.7  79.0 - 97.0 fL Final    MCH 02/20/2025 31.8  26.6 - 33.0 pg Final    MCHC 02/20/2025 33.9  31.5 - 35.7 g/dL Final    RDW 02/20/2025 12.7  12.3 - 15.4 % Final    RDW-SD 02/20/2025 43.4  37.0 - 54.0 fl Final    MPV 02/20/2025 9.9  6.0 - 12.0 fL Final    Platelets 02/20/2025 370  140 - 450 10*3/mm3 Final    Glucose 02/20/2025 122 (H)  65 - 99 mg/dL Final    BUN 02/20/2025 20  8 - 23 mg/dL Final    Creatinine 02/20/2025 0.78  0.57 - 1.00 mg/dL Final    Sodium 02/20/2025 139  136 - 145 mmol/L Final    Potassium 02/20/2025 3.8  3.5 - 5.2 mmol/L Final    Chloride 02/20/2025 102  98 - 107 mmol/L Final    CO2 02/20/2025 25.7  22.0 - 29.0 mmol/L Final    Calcium 02/20/2025 9.4  8.6 - 10.5 mg/dL Final    BUN/Creatinine Ratio 02/20/2025 25.6 (H)  7.0 - 25.0 Final    Anion Gap 02/20/2025 11.3  5.0 - 15.0 mmol/L Final    eGFR 02/20/2025 77.4  >60.0 mL/min/1.73 Final    Hemoglobin A1C 02/20/2025 5.60  4.80 - 5.60 % Final    ABO Type 02/20/2025 AB   Final    RH type 02/20/2025 Positive   Final    Antibody Screen 02/20/2025 Negative   Final    T&S Expiration Date 02/20/2025 3/6/2025 11:59:00 PM   Final    Protime 02/20/2025 13.5  11.7 - 14.2 Seconds Final    INR 02/20/2025 1.03  0.90 - 1.10 Final     No results found.    Exam unchanged    Xrays:  Xrays AP pelvis and a lateral of the Right hip were reviewed demonstrating  End stage hip OA with bone on bone articulation, subchondral cysts and periarticular osteophytes.    Assessment:   End-stage Right hip osteoarthritis. Conservative measures have failed.      Plan:  The plan is to proceed with Right Total Hip Replacement. The patient voiced understanding of the risks, benefits, and alternative forms of treatment that were discussed with Dr Messina at the time of scheduling.  Labs reviewed.  Patient had previously obtain cardiac clearance.  States she is under joint class.    Planning to stay a 23-hour observation.  But wanted to consider going home she can let us know the morning of if needed.    Alphonse Messina MD  2/27/2025

## 2025-03-03 PROCEDURE — S0260 H&P FOR SURGERY: HCPCS | Performed by: ORTHOPAEDIC SURGERY

## 2025-03-03 NOTE — H&P
Patient: Rita Hendrix        YOB: 1945    Medical Record Number: [id number]    Admitting Physician: Dr. Alphonse Messina    Reason for Admission: End Stage Right Hip OA    History of Present Illness: 79 y.o. female presents with severe end stage hip osteoarthritis which has not been responsive to the full complement of conservative measures. Despite conservative attempts, there is still severe, constant activity limiting hip pain. Given the severity of the pain, the patient has elected to proceed with hip replacement.    Allergies:   Allergies   Allergen Reactions    Penicillins Hives    Sulfa Antibiotics Itching         Current Medications:  Home Medications:    No current facility-administered medications on file prior to encounter.     Current Outpatient Medications on File Prior to Encounter   Medication Sig    hydrOXYzine (ATARAX) 25 MG tablet Take 1 tablet by mouth Every 8 (Eight) Hours As Needed for Itching.    levothyroxine (SYNTHROID, LEVOTHROID) 75 MCG tablet Take 1 tablet by mouth once daily (Patient taking differently: Take 1 tablet by mouth Every Morning.)    losartan (COZAAR) 25 MG tablet Take 1 tablet by mouth Every Night. PT TO HOLD 24 HOUR PRIOR TO SURGERY    magnesium oxide (MAGOX) 400 (241.3 Mg) MG tablet tablet Take 1 tablet by mouth Daily. HELD    metoprolol succinate XL (TOPROL-XL) 25 MG 24 hr tablet Take 1 tablet by mouth Daily.    multivitamin with minerals tablet tablet Take 1 tablet by mouth Daily. HELD    SUMAtriptan (IMITREX) 50 MG tablet Take one tablet at onset of headache. May repeat dose one time in 2 hours if headache not relieved. (Patient taking differently: Take 1 tablet by mouth Every 2 (Two) Hours As Needed for Migraine. Take one tablet at onset of headache. May repeat dose one time in 2 hours if headache not relieved.)    venlafaxine XR (EFFEXOR-XR) 37.5 MG 24 hr capsule Take 1 capsule by mouth once daily    vitamin D3 125 MCG (5000 UT) capsule capsule Take 1  capsule by mouth Daily. HELD     PRN Meds:.    PMH:  Past Medical History:   Diagnosis Date    Anxiety     Arthritis     COVID 2022    Headache     Hip pain     [place] (hard of hearing)     Hypertension     Hypothyroidism     Migraines     Radius fracture         PSURGH:  Past Surgical History:   Procedure Laterality Date    EYE SURGERY      HERNIA REPAIR         SocialHx:  Social History     Occupational History    Occupation: Therapist   Tobacco Use    Smoking status: Former     Current packs/day: 0.00     Average packs/day: 1 pack/day for 15.0 years (15.0 ttl pk-yrs)     Types: Cigarettes     Start date:      Quit date:      Years since quittin.1     Passive exposure: Past    Smokeless tobacco: Never    Tobacco comments:     40 yrs quit   Vaping Use    Vaping status: Never Used   Substance and Sexual Activity    Alcohol use: Yes     Comment: 4-5/week    Drug use: Yes     Types: Marijuana     Comment: STATED HAS A LETTER TO USE FROM DR GONZALEZ    Sexual activity: Defer     Partners: Male      Social History     Social History Narrative    Lives at home with        FamHx:  Family History   Problem Relation Age of Onset    Stroke Mother     Parkinsonism Mother     Cancer Father         Pancreatic    Malig Hyperthermia Neg Hx          Review of Systems:   A 14 point review of systems was performed, pertinent positives discussed above, all other systems are negative    Physical Exam: 79 y.o. female  Vital Signs :   There were no vitals filed for this visit.    General: Alert and Oriented x 3, No acute distress.  Psych: mood and affect appropriate; recent and remote memory intact  Eyes: conjunctivae clear; pupils equally round and reactive, Sclerae anicteric  CV: no peripheral edema  Resp: normal respiratory effort  Skin: no rashes or wounds; normal turgor  Musculosketetal; pain with hip range of motion. Positive stinchfeld test. No trochanteric  Tenderness.  Vascular: palpable distal  pulses    Labs:    Pre-Admission Testing on 02/20/2025   Component Date Value Ref Range Status    WBC 02/20/2025 8.20  3.40 - 10.80 10*3/mm3 Final    RBC 02/20/2025 4.75  3.77 - 5.28 10*6/mm3 Final    Hemoglobin 02/20/2025 15.1  12.0 - 15.9 g/dL Final    Hematocrit 02/20/2025 44.5  34.0 - 46.6 % Final    MCV 02/20/2025 93.7  79.0 - 97.0 fL Final    MCH 02/20/2025 31.8  26.6 - 33.0 pg Final    MCHC 02/20/2025 33.9  31.5 - 35.7 g/dL Final    RDW 02/20/2025 12.7  12.3 - 15.4 % Final    RDW-SD 02/20/2025 43.4  37.0 - 54.0 fl Final    MPV 02/20/2025 9.9  6.0 - 12.0 fL Final    Platelets 02/20/2025 370  140 - 450 10*3/mm3 Final    Glucose 02/20/2025 122 (H)  65 - 99 mg/dL Final    BUN 02/20/2025 20  8 - 23 mg/dL Final    Creatinine 02/20/2025 0.78  0.57 - 1.00 mg/dL Final    Sodium 02/20/2025 139  136 - 145 mmol/L Final    Potassium 02/20/2025 3.8  3.5 - 5.2 mmol/L Final    Chloride 02/20/2025 102  98 - 107 mmol/L Final    CO2 02/20/2025 25.7  22.0 - 29.0 mmol/L Final    Calcium 02/20/2025 9.4  8.6 - 10.5 mg/dL Final    BUN/Creatinine Ratio 02/20/2025 25.6 (H)  7.0 - 25.0 Final    Anion Gap 02/20/2025 11.3  5.0 - 15.0 mmol/L Final    eGFR 02/20/2025 77.4  >60.0 mL/min/1.73 Final    Hemoglobin A1C 02/20/2025 5.60  4.80 - 5.60 % Final    ABO Type 02/20/2025 AB   Final    RH type 02/20/2025 Positive   Final    Antibody Screen 02/20/2025 Negative   Final    T&S Expiration Date 02/20/2025 3/6/2025 11:59:00 PM   Final    Protime 02/20/2025 13.5  11.7 - 14.2 Seconds Final    INR 02/20/2025 1.03  0.90 - 1.10 Final     No results found.    Exam unchanged    Xrays:  Xrays AP pelvis and a lateral of the Right hip were reviewed demonstrating  End stage hip OA with bone on bone articulation, subchondral cysts and periarticular osteophytes.    Assessment:  End-stage Right hip osteoarthritis. Conservative measures have failed.      Plan:  The plan is to proceed with Right Total Hip Replacement. The patient voiced understanding of the  risks, benefits, and alternative forms of treatment that were discussed with Dr Messina at the time of scheduling.  Labs reviewed.  Patient had previously obtain cardiac clearance.  States she is under joint class.    Planning to stay a 23-hour observation.  But wanted to consider going home she can let us know the morning of if needed.    Alphonse Messina MD  3/3/2025     This note was copied and pasted from most recent office visit.  Interval addendum may be made to update this documentation as needed.    Alphonse Messina MD

## 2025-03-05 ENCOUNTER — ANESTHESIA (OUTPATIENT)
Dept: PERIOP | Facility: HOSPITAL | Age: 80
End: 2025-03-05
Payer: MEDICARE

## 2025-03-05 ENCOUNTER — APPOINTMENT (OUTPATIENT)
Dept: GENERAL RADIOLOGY | Facility: HOSPITAL | Age: 80
End: 2025-03-05
Payer: MEDICARE

## 2025-03-05 ENCOUNTER — ANESTHESIA EVENT (OUTPATIENT)
Dept: PERIOP | Facility: HOSPITAL | Age: 80
End: 2025-03-05
Payer: MEDICARE

## 2025-03-05 ENCOUNTER — HOSPITAL ENCOUNTER (OUTPATIENT)
Facility: HOSPITAL | Age: 80
Setting detail: SURGERY ADMIT
Discharge: HOME | End: 2025-03-05
Attending: ORTHOPAEDIC SURGERY | Admitting: ORTHOPAEDIC SURGERY
Payer: MEDICARE

## 2025-03-05 VITALS
DIASTOLIC BLOOD PRESSURE: 64 MMHG | OXYGEN SATURATION: 94 % | HEART RATE: 66 BPM | SYSTOLIC BLOOD PRESSURE: 110 MMHG | RESPIRATION RATE: 16 BRPM | TEMPERATURE: 97.6 F

## 2025-03-05 DIAGNOSIS — M16.11 PRIMARY OSTEOARTHRITIS OF RIGHT HIP: Primary | ICD-10-CM

## 2025-03-05 PROCEDURE — 25010000002 VANCOMYCIN 1 G RECONSTITUTED SOLUTION 1 EACH VIAL: Performed by: ORTHOPAEDIC SURGERY

## 2025-03-05 PROCEDURE — C1776 JOINT DEVICE (IMPLANTABLE): HCPCS | Performed by: ORTHOPAEDIC SURGERY

## 2025-03-05 PROCEDURE — 73501 X-RAY EXAM HIP UNI 1 VIEW: CPT

## 2025-03-05 PROCEDURE — 97162 PT EVAL MOD COMPLEX 30 MIN: CPT

## 2025-03-05 PROCEDURE — 25010000002 EPINEPHRINE 1 MG/ML SOLUTION 30 ML VIAL: Performed by: ORTHOPAEDIC SURGERY

## 2025-03-05 PROCEDURE — 25010000002 HYDROMORPHONE 1 MG/ML SOLUTION: Performed by: NURSE ANESTHETIST, CERTIFIED REGISTERED

## 2025-03-05 PROCEDURE — 25010000002 MORPHINE PER 10 MG: Performed by: ORTHOPAEDIC SURGERY

## 2025-03-05 PROCEDURE — 76000 FLUOROSCOPY <1 HR PHYS/QHP: CPT

## 2025-03-05 PROCEDURE — 25010000002 DEXAMETHASONE PER 1 MG: Performed by: NURSE ANESTHETIST, CERTIFIED REGISTERED

## 2025-03-05 PROCEDURE — 25010000002 MAGNESIUM SULFATE PER 500 MG OF MAGNESIUM: Performed by: NURSE ANESTHETIST, CERTIFIED REGISTERED

## 2025-03-05 PROCEDURE — 27130 TOTAL HIP ARTHROPLASTY: CPT | Performed by: ORTHOPAEDIC SURGERY

## 2025-03-05 PROCEDURE — 25010000002 KETOROLAC TROMETHAMINE PER 15 MG: Performed by: ORTHOPAEDIC SURGERY

## 2025-03-05 PROCEDURE — 25810000003 LACTATED RINGERS PER 1000 ML: Performed by: ANESTHESIOLOGY

## 2025-03-05 PROCEDURE — 25010000002 CEFAZOLIN PER 500 MG: Performed by: ORTHOPAEDIC SURGERY

## 2025-03-05 PROCEDURE — 25010000002 SUGAMMADEX 200 MG/2ML SOLUTION: Performed by: NURSE ANESTHETIST, CERTIFIED REGISTERED

## 2025-03-05 PROCEDURE — 25010000002 HYDROMORPHONE PER 4 MG: Performed by: NURSE ANESTHETIST, CERTIFIED REGISTERED

## 2025-03-05 PROCEDURE — 25010000002 PROPOFOL 10 MG/ML EMULSION: Performed by: NURSE ANESTHETIST, CERTIFIED REGISTERED

## 2025-03-05 PROCEDURE — C1713 ANCHOR/SCREW BN/BN,TIS/BN: HCPCS | Performed by: ORTHOPAEDIC SURGERY

## 2025-03-05 PROCEDURE — 25010000002 ONDANSETRON PER 1 MG: Performed by: NURSE ANESTHETIST, CERTIFIED REGISTERED

## 2025-03-05 PROCEDURE — 25010000002 FENTANYL CITRATE (PF) 50 MCG/ML SOLUTION: Performed by: NURSE ANESTHETIST, CERTIFIED REGISTERED

## 2025-03-05 PROCEDURE — 25010000002 LIDOCAINE PF 2% 2 % SOLUTION: Performed by: NURSE ANESTHETIST, CERTIFIED REGISTERED

## 2025-03-05 PROCEDURE — 25010000002 ROPIVACAINE PER 1 MG: Performed by: ORTHOPAEDIC SURGERY

## 2025-03-05 PROCEDURE — 25810000003 SODIUM CHLORIDE 0.9 % SOLUTION 250 ML FLEX CONT: Performed by: ORTHOPAEDIC SURGERY

## 2025-03-05 DEVICE — IMPLANTABLE DEVICE
Type: IMPLANTABLE DEVICE | Site: HIP | Status: FUNCTIONAL
Brand: G7® ACETABULAR SYSTEM

## 2025-03-05 DEVICE — BIOLOX® DELTA, CERAMIC FEMORAL HEAD, S, Ø 40/-3.5, TAPER 12/14
Type: IMPLANTABLE DEVICE | Site: HIP | Status: FUNCTIONAL
Brand: BIOLOX® DELTA

## 2025-03-05 DEVICE — SCRW ACET CORT TRILOGY S/TAP 6.5X30: Type: IMPLANTABLE DEVICE | Site: HIP | Status: FUNCTIONAL

## 2025-03-05 DEVICE — SCRW ACET CORT TRILOGY S/TAP 6.5X25: Type: IMPLANTABLE DEVICE | Site: HIP | Status: FUNCTIONAL

## 2025-03-05 DEVICE — TOTAL HIP PRIMARY: Type: IMPLANTABLE DEVICE | Status: FUNCTIONAL

## 2025-03-05 DEVICE — CP HIP UPCHRG OSSEOTI LTD HL CUPS: Type: IMPLANTABLE DEVICE | Status: FUNCTIONAL

## 2025-03-05 DEVICE — DEV WND/CLS CONTRL TISS STRATAFIX SPIRAL PDS PLS CT1 0 30CM: Type: IMPLANTABLE DEVICE | Site: HIP | Status: FUNCTIONAL

## 2025-03-05 DEVICE — STEM FEM/HIP AVENIR/COMPLETE HI/OFFST HA SZ4: Type: IMPLANTABLE DEVICE | Site: HIP | Status: FUNCTIONAL

## 2025-03-05 DEVICE — IMPLANTABLE DEVICE
Type: IMPLANTABLE DEVICE | Site: HIP | Status: FUNCTIONAL
Brand: G7® VIVACIT-E®

## 2025-03-05 DEVICE — DEV CONTRL TISS STRATAFIX SPIRAL MNCRYL UD 3/0 PLS 30CM: Type: IMPLANTABLE DEVICE | Site: HIP | Status: FUNCTIONAL

## 2025-03-05 RX ORDER — ACETAMINOPHEN 325 MG/1
1000 TABLET ORAL ONCE
Status: COMPLETED | OUTPATIENT
Start: 2025-03-05 | End: 2025-03-05

## 2025-03-05 RX ORDER — KETOROLAC TROMETHAMINE 30 MG/ML
15 INJECTION, SOLUTION INTRAMUSCULAR; INTRAVENOUS EVERY 8 HOURS
Status: CANCELLED | OUTPATIENT
Start: 2025-03-05 | End: 2025-03-06

## 2025-03-05 RX ORDER — HYDROCODONE BITARTRATE AND ACETAMINOPHEN 7.5; 325 MG/1; MG/1
1 TABLET ORAL EVERY 4 HOURS PRN
Status: CANCELLED | OUTPATIENT
Start: 2025-03-05 | End: 2025-03-12

## 2025-03-05 RX ORDER — MELOXICAM 15 MG/1
7.5 TABLET ORAL DAILY
Qty: 4 TABLET | Refills: 0 | Status: SHIPPED | OUTPATIENT
Start: 2025-03-05 | End: 2025-03-12

## 2025-03-05 RX ORDER — NALOXONE HCL 0.4 MG/ML
0.2 VIAL (ML) INJECTION AS NEEDED
Status: DISCONTINUED | OUTPATIENT
Start: 2025-03-05 | End: 2025-03-05 | Stop reason: HOSPADM

## 2025-03-05 RX ORDER — PROMETHAZINE HYDROCHLORIDE 25 MG/1
25 TABLET ORAL ONCE AS NEEDED
Status: DISCONTINUED | OUTPATIENT
Start: 2025-03-05 | End: 2025-03-05 | Stop reason: HOSPADM

## 2025-03-05 RX ORDER — ROCURONIUM BROMIDE 10 MG/ML
INJECTION, SOLUTION INTRAVENOUS AS NEEDED
Status: DISCONTINUED | OUTPATIENT
Start: 2025-03-05 | End: 2025-03-05 | Stop reason: SURG

## 2025-03-05 RX ORDER — PANTOPRAZOLE SODIUM 40 MG/1
40 TABLET, DELAYED RELEASE ORAL DAILY
Qty: 7 TABLET | Refills: 0 | Status: SHIPPED | OUTPATIENT
Start: 2025-03-05 | End: 2025-03-12

## 2025-03-05 RX ORDER — IPRATROPIUM BROMIDE AND ALBUTEROL SULFATE 2.5; .5 MG/3ML; MG/3ML
3 SOLUTION RESPIRATORY (INHALATION) ONCE AS NEEDED
Status: DISCONTINUED | OUTPATIENT
Start: 2025-03-05 | End: 2025-03-05 | Stop reason: HOSPADM

## 2025-03-05 RX ORDER — HYDRALAZINE HYDROCHLORIDE 20 MG/ML
5 INJECTION INTRAMUSCULAR; INTRAVENOUS
Status: DISCONTINUED | OUTPATIENT
Start: 2025-03-05 | End: 2025-03-05 | Stop reason: HOSPADM

## 2025-03-05 RX ORDER — PROMETHAZINE HYDROCHLORIDE 25 MG/1
25 SUPPOSITORY RECTAL ONCE AS NEEDED
Status: DISCONTINUED | OUTPATIENT
Start: 2025-03-05 | End: 2025-03-05 | Stop reason: HOSPADM

## 2025-03-05 RX ORDER — ASPIRIN 81 MG/1
81 TABLET ORAL EVERY 12 HOURS SCHEDULED
Status: CANCELLED | OUTPATIENT
Start: 2025-03-06

## 2025-03-05 RX ORDER — HYDROCODONE BITARTRATE AND ACETAMINOPHEN 5; 325 MG/1; MG/1
1 TABLET ORAL ONCE AS NEEDED
Status: DISCONTINUED | OUTPATIENT
Start: 2025-03-05 | End: 2025-03-05 | Stop reason: HOSPADM

## 2025-03-05 RX ORDER — FENTANYL CITRATE 50 UG/ML
25 INJECTION, SOLUTION INTRAMUSCULAR; INTRAVENOUS
Status: DISCONTINUED | OUTPATIENT
Start: 2025-03-05 | End: 2025-03-05 | Stop reason: HOSPADM

## 2025-03-05 RX ORDER — ONDANSETRON 2 MG/ML
INJECTION INTRAMUSCULAR; INTRAVENOUS AS NEEDED
Status: DISCONTINUED | OUTPATIENT
Start: 2025-03-05 | End: 2025-03-05 | Stop reason: SURG

## 2025-03-05 RX ORDER — FENTANYL CITRATE 50 UG/ML
50 INJECTION, SOLUTION INTRAMUSCULAR; INTRAVENOUS ONCE AS NEEDED
Status: DISCONTINUED | OUTPATIENT
Start: 2025-03-05 | End: 2025-03-05 | Stop reason: HOSPADM

## 2025-03-05 RX ORDER — MAGNESIUM HYDROXIDE 1200 MG/15ML
LIQUID ORAL AS NEEDED
Status: DISCONTINUED | OUTPATIENT
Start: 2025-03-05 | End: 2025-03-05 | Stop reason: HOSPADM

## 2025-03-05 RX ORDER — DEXAMETHASONE SODIUM PHOSPHATE 4 MG/ML
INJECTION, SOLUTION INTRA-ARTICULAR; INTRALESIONAL; INTRAMUSCULAR; INTRAVENOUS; SOFT TISSUE AS NEEDED
Status: DISCONTINUED | OUTPATIENT
Start: 2025-03-05 | End: 2025-03-05 | Stop reason: SURG

## 2025-03-05 RX ORDER — EPHEDRINE SULFATE 50 MG/ML
5 INJECTION, SOLUTION INTRAVENOUS ONCE AS NEEDED
Status: DISCONTINUED | OUTPATIENT
Start: 2025-03-05 | End: 2025-03-05 | Stop reason: HOSPADM

## 2025-03-05 RX ORDER — HYDROCODONE BITARTRATE AND ACETAMINOPHEN 7.5; 325 MG/1; MG/1
1 TABLET ORAL EVERY 4 HOURS PRN
Qty: 28 TABLET | Refills: 0 | Status: SHIPPED | OUTPATIENT
Start: 2025-03-05

## 2025-03-05 RX ORDER — LIDOCAINE HYDROCHLORIDE 10 MG/ML
0.5 INJECTION, SOLUTION INFILTRATION; PERINEURAL ONCE AS NEEDED
Status: DISCONTINUED | OUTPATIENT
Start: 2025-03-05 | End: 2025-03-05 | Stop reason: HOSPADM

## 2025-03-05 RX ORDER — TRANEXAMIC ACID 100 MG/ML
INJECTION, SOLUTION INTRAVENOUS AS NEEDED
Status: DISCONTINUED | OUTPATIENT
Start: 2025-03-05 | End: 2025-03-05 | Stop reason: SURG

## 2025-03-05 RX ORDER — MIDAZOLAM HYDROCHLORIDE 1 MG/ML
0.5 INJECTION, SOLUTION INTRAMUSCULAR; INTRAVENOUS
Status: DISCONTINUED | OUTPATIENT
Start: 2025-03-05 | End: 2025-03-05 | Stop reason: HOSPADM

## 2025-03-05 RX ORDER — FAMOTIDINE 10 MG/ML
20 INJECTION, SOLUTION INTRAVENOUS ONCE
Status: COMPLETED | OUTPATIENT
Start: 2025-03-05 | End: 2025-03-05

## 2025-03-05 RX ORDER — MAGNESIUM SULFATE HEPTAHYDRATE 500 MG/ML
INJECTION, SOLUTION INTRAMUSCULAR; INTRAVENOUS AS NEEDED
Status: DISCONTINUED | OUTPATIENT
Start: 2025-03-05 | End: 2025-03-05 | Stop reason: SURG

## 2025-03-05 RX ORDER — SODIUM CHLORIDE, SODIUM LACTATE, POTASSIUM CHLORIDE, CALCIUM CHLORIDE 600; 310; 30; 20 MG/100ML; MG/100ML; MG/100ML; MG/100ML
9 INJECTION, SOLUTION INTRAVENOUS CONTINUOUS
Status: DISCONTINUED | OUTPATIENT
Start: 2025-03-05 | End: 2025-03-05 | Stop reason: HOSPADM

## 2025-03-05 RX ORDER — SODIUM CHLORIDE 0.9 % (FLUSH) 0.9 %
3 SYRINGE (ML) INJECTION EVERY 12 HOURS SCHEDULED
Status: DISCONTINUED | OUTPATIENT
Start: 2025-03-05 | End: 2025-03-05

## 2025-03-05 RX ORDER — HYDROCODONE BITARTRATE AND ACETAMINOPHEN 7.5; 325 MG/1; MG/1
2 TABLET ORAL EVERY 4 HOURS PRN
Status: CANCELLED | OUTPATIENT
Start: 2025-03-05 | End: 2025-03-12

## 2025-03-05 RX ORDER — ASPIRIN 81 MG/1
81 TABLET ORAL 2 TIMES DAILY
Qty: 60 TABLET | Refills: 0 | Status: SHIPPED | OUTPATIENT
Start: 2025-03-06 | End: 2025-04-05

## 2025-03-05 RX ORDER — SODIUM CHLORIDE 0.9 % (FLUSH) 0.9 %
3-10 SYRINGE (ML) INJECTION AS NEEDED
Status: DISCONTINUED | OUTPATIENT
Start: 2025-03-05 | End: 2025-03-05

## 2025-03-05 RX ORDER — HYDROMORPHONE HYDROCHLORIDE 1 MG/ML
0.25 INJECTION, SOLUTION INTRAMUSCULAR; INTRAVENOUS; SUBCUTANEOUS
Status: DISCONTINUED | OUTPATIENT
Start: 2025-03-05 | End: 2025-03-05 | Stop reason: HOSPADM

## 2025-03-05 RX ORDER — PREGABALIN 75 MG/1
75 CAPSULE ORAL ONCE
Status: DISCONTINUED | OUTPATIENT
Start: 2025-03-05 | End: 2025-03-05

## 2025-03-05 RX ORDER — FLUMAZENIL 0.1 MG/ML
0.2 INJECTION INTRAVENOUS AS NEEDED
Status: DISCONTINUED | OUTPATIENT
Start: 2025-03-05 | End: 2025-03-05 | Stop reason: HOSPADM

## 2025-03-05 RX ORDER — LIDOCAINE HYDROCHLORIDE 20 MG/ML
INJECTION, SOLUTION EPIDURAL; INFILTRATION; INTRACAUDAL; PERINEURAL AS NEEDED
Status: DISCONTINUED | OUTPATIENT
Start: 2025-03-05 | End: 2025-03-05 | Stop reason: SURG

## 2025-03-05 RX ORDER — EPHEDRINE SULFATE 50 MG/ML
INJECTION INTRAVENOUS AS NEEDED
Status: DISCONTINUED | OUTPATIENT
Start: 2025-03-05 | End: 2025-03-05 | Stop reason: SURG

## 2025-03-05 RX ORDER — ONDANSETRON 4 MG/1
4 TABLET, FILM COATED ORAL EVERY 8 HOURS PRN
Qty: 8 TABLET | Refills: 0 | Status: SHIPPED | OUTPATIENT
Start: 2025-03-05

## 2025-03-05 RX ORDER — FENTANYL CITRATE 50 UG/ML
INJECTION, SOLUTION INTRAMUSCULAR; INTRAVENOUS AS NEEDED
Status: DISCONTINUED | OUTPATIENT
Start: 2025-03-05 | End: 2025-03-05 | Stop reason: SURG

## 2025-03-05 RX ORDER — PROPOFOL 10 MG/ML
VIAL (ML) INTRAVENOUS AS NEEDED
Status: DISCONTINUED | OUTPATIENT
Start: 2025-03-05 | End: 2025-03-05 | Stop reason: SURG

## 2025-03-05 RX ORDER — ATROPINE SULFATE 0.4 MG/ML
0.4 INJECTION, SOLUTION INTRAMUSCULAR; INTRAVENOUS; SUBCUTANEOUS ONCE AS NEEDED
Status: DISCONTINUED | OUTPATIENT
Start: 2025-03-05 | End: 2025-03-05 | Stop reason: HOSPADM

## 2025-03-05 RX ORDER — MELOXICAM 7.5 MG/1
7.5 TABLET ORAL ONCE
Status: COMPLETED | OUTPATIENT
Start: 2025-03-05 | End: 2025-03-05

## 2025-03-05 RX ORDER — POLYETHYLENE GLYCOL 3350 17 G/17G
17 POWDER, FOR SOLUTION ORAL DAILY
Qty: 116 G | Refills: 0 | Status: SHIPPED | OUTPATIENT
Start: 2025-03-05 | End: 2025-03-12

## 2025-03-05 RX ORDER — HYDROCODONE BITARTRATE AND ACETAMINOPHEN 7.5; 325 MG/1; MG/1
1 TABLET ORAL EVERY 4 HOURS PRN
Status: DISCONTINUED | OUTPATIENT
Start: 2025-03-05 | End: 2025-03-05 | Stop reason: HOSPADM

## 2025-03-05 RX ORDER — LABETALOL HYDROCHLORIDE 5 MG/ML
5 INJECTION, SOLUTION INTRAVENOUS
Status: DISCONTINUED | OUTPATIENT
Start: 2025-03-05 | End: 2025-03-05 | Stop reason: HOSPADM

## 2025-03-05 RX ORDER — ONDANSETRON 2 MG/ML
4 INJECTION INTRAMUSCULAR; INTRAVENOUS ONCE AS NEEDED
Status: DISCONTINUED | OUTPATIENT
Start: 2025-03-05 | End: 2025-03-05 | Stop reason: HOSPADM

## 2025-03-05 RX ORDER — DOCUSATE SODIUM 100 MG/1
100 CAPSULE, LIQUID FILLED ORAL 2 TIMES DAILY
Qty: 60 CAPSULE | Refills: 0 | Status: SHIPPED | OUTPATIENT
Start: 2025-03-05

## 2025-03-05 RX ORDER — DIPHENHYDRAMINE HYDROCHLORIDE 50 MG/ML
12.5 INJECTION INTRAMUSCULAR; INTRAVENOUS
Status: DISCONTINUED | OUTPATIENT
Start: 2025-03-05 | End: 2025-03-05 | Stop reason: HOSPADM

## 2025-03-05 RX ADMIN — ACETAMINOPHEN 975 MG: 500 TABLET ORAL at 06:09

## 2025-03-05 RX ADMIN — VANCOMYCIN HYDROCHLORIDE 1000 MG: 1 INJECTION, POWDER, LYOPHILIZED, FOR SOLUTION INTRAVENOUS at 06:10

## 2025-03-05 RX ADMIN — SODIUM CHLORIDE, POTASSIUM CHLORIDE, SODIUM LACTATE AND CALCIUM CHLORIDE: 600; 310; 30; 20 INJECTION, SOLUTION INTRAVENOUS at 08:41

## 2025-03-05 RX ADMIN — PROPOFOL 75 MCG/KG/MIN: 10 INJECTION, EMULSION INTRAVENOUS at 07:19

## 2025-03-05 RX ADMIN — PROPOFOL 50 MG: 10 INJECTION, EMULSION INTRAVENOUS at 08:21

## 2025-03-05 RX ADMIN — PROPOFOL 120 MG: 10 INJECTION, EMULSION INTRAVENOUS at 07:03

## 2025-03-05 RX ADMIN — EPHEDRINE SULFATE 5 MG: 50 INJECTION INTRAVENOUS at 08:01

## 2025-03-05 RX ADMIN — DEXAMETHASONE SODIUM PHOSPHATE 10 MG: 4 INJECTION, SOLUTION INTRA-ARTICULAR; INTRALESIONAL; INTRAMUSCULAR; INTRAVENOUS; SOFT TISSUE at 07:30

## 2025-03-05 RX ADMIN — TRANEXAMIC ACID 1000 MG: 100 INJECTION, SOLUTION INTRAVENOUS at 08:37

## 2025-03-05 RX ADMIN — ROCURONIUM BROMIDE 50 MG: 10 INJECTION, SOLUTION INTRAVENOUS at 07:03

## 2025-03-05 RX ADMIN — HYDROMORPHONE HYDROCHLORIDE 0.25 MG: 1 INJECTION, SOLUTION INTRAMUSCULAR; INTRAVENOUS; SUBCUTANEOUS at 09:20

## 2025-03-05 RX ADMIN — Medication 10 MG: at 07:15

## 2025-03-05 RX ADMIN — EPHEDRINE SULFATE 5 MG: 50 INJECTION INTRAVENOUS at 07:59

## 2025-03-05 RX ADMIN — FAMOTIDINE 20 MG: 10 INJECTION INTRAVENOUS at 06:09

## 2025-03-05 RX ADMIN — FENTANYL CITRATE 25 MCG: 50 INJECTION, SOLUTION INTRAMUSCULAR; INTRAVENOUS at 09:35

## 2025-03-05 RX ADMIN — MELOXICAM 7.5 MG: 7.5 TABLET ORAL at 06:10

## 2025-03-05 RX ADMIN — SUGAMMADEX 200 MG: 100 INJECTION, SOLUTION INTRAVENOUS at 08:45

## 2025-03-05 RX ADMIN — SODIUM CHLORIDE 2 G: 900 INJECTION INTRAVENOUS at 06:54

## 2025-03-05 RX ADMIN — TRANEXAMIC ACID 1000 MG: 100 INJECTION, SOLUTION INTRAVENOUS at 07:26

## 2025-03-05 RX ADMIN — FENTANYL CITRATE 50 MCG: 50 INJECTION, SOLUTION INTRAMUSCULAR; INTRAVENOUS at 07:03

## 2025-03-05 RX ADMIN — HYDROMORPHONE HYDROCHLORIDE 0.25 MG: 1 INJECTION, SOLUTION INTRAMUSCULAR; INTRAVENOUS; SUBCUTANEOUS at 09:25

## 2025-03-05 RX ADMIN — HYDROMORPHONE HYDROCHLORIDE 0.5 MG: 1 INJECTION, SOLUTION INTRAMUSCULAR; INTRAVENOUS; SUBCUTANEOUS at 08:20

## 2025-03-05 RX ADMIN — MAGNESIUM SULFATE HEPTAHYDRATE 1 G: 500 INJECTION, SOLUTION INTRAMUSCULAR; INTRAVENOUS at 07:35

## 2025-03-05 RX ADMIN — HYDROMORPHONE HYDROCHLORIDE 0.5 MG: 1 INJECTION, SOLUTION INTRAMUSCULAR; INTRAVENOUS; SUBCUTANEOUS at 08:12

## 2025-03-05 RX ADMIN — ONDANSETRON 4 MG: 2 INJECTION, SOLUTION INTRAMUSCULAR; INTRAVENOUS at 08:39

## 2025-03-05 RX ADMIN — HYDROCODONE BITARTRATE AND ACETAMINOPHEN 1 TABLET: 7.5; 325 TABLET ORAL at 10:03

## 2025-03-05 RX ADMIN — Medication 10 MG: at 07:21

## 2025-03-05 RX ADMIN — FENTANYL CITRATE 25 MCG: 50 INJECTION, SOLUTION INTRAMUSCULAR; INTRAVENOUS at 09:40

## 2025-03-05 RX ADMIN — LIDOCAINE HYDROCHLORIDE 100 MG: 20 INJECTION, SOLUTION EPIDURAL; INFILTRATION; INTRACAUDAL; PERINEURAL at 07:03

## 2025-03-05 NOTE — PLAN OF CARE
Goal Outcome Evaluation:  Plan of Care Reviewed With: patient, spouse      Pt is 78 y/o F admitted to [facility] on 3/5/25 for R NIC, anterior approach, she is POD0. PMH: HTN, hypothyroidism, migraines. At baseline pt is from home with spouse, no MARIBELL but sunken living room, she is indep with mobility prior to sx. Pt currently demos CGA for transfers, ambulation up to 100' with rwx, and 3 step negotiation. Instructed in use of rwx for step in/out of living room. Education provided regarding sleeping positions, getting in/out of bed, and hip precautions. Pt demos mobility below baseline and therefore would benefit from acute skilled PT to address functional mobility deficits. Anticipate d/c home with assist and home health.            Anticipated Discharge Disposition (PT): home with assist, home with home health

## 2025-03-05 NOTE — OP NOTE
Operative Report        Facility: [facility]  Patient Name: Rita Hendrix  YOB: 1945  Date: 3/5/2025  Medical Record Number: [id number]       Preoperative diagnosis: right  Hip Osteoarthritis     Postoperative diagnosis:  right Hip Osteoarthritis     Surgery performed: right Total hip arthroplasty    Surgical Approach: Hip Direct Anterior (Smith-Bond)      Implants:    Implant Name Type Inv. Item Serial No.  Lot No. LRB No. Used Action   DEV CONTRL TISS STRATAFIX SPIRAL MNCRYL UD 3/0 PLS 30CM - [id number] Implant DEV CONTRL TISS STRATAFIX SPIRAL MNCRYL UD 3/0 PLS 30CM  [employer]  DIV OF J AND J [id number] Right 1 Implanted   DEV WND/CLS CONTRL TISS STRATAFIX SPIRAL PDS PLS CT1 0 30CM - [id number] Implant DEV WND/CLS CONTRL TISS STRATAFIX SPIRAL PDS PLS CT1 0 30CM  [employer]  DIV OF J AND J [id number] Right 1 Implanted   SHLL ACET OSSEOTI G7 4HL SZF 54MM - [id number] Implant SHLL ACET OSSEOTI G7 4HL SZF 54MM  [employer] [id number] Right 1 Implanted   SCRW ACET VARUN TRILOGY S/TAP 6.5X25 - [id number] Implant SCRW ACET VARUN TRILOGY S/TAP 6.5X25  [employer] [id number] Right 1 Implanted   LINER ACET G7 VIVACIT/E NTRL HXPE SZF 40MM - [id number] Implant LINER ACET G7 VIVACIT/E NTRL HXPE SZF 40MM  [employer] [id number] Right 1 Implanted   SCRW ACET VARUN TRILOGY S/TAP 6.5X30 - [id number] Implant SCRW ACET VARUN TRILOGY S/TAP 6.5X30  [employer] [id number] Right 1 Implanted   HD FEM/HIP BIOLOX/DELTA CERAM 12/32Z84BN MIN3.5MM - [id number] Implant HD FEM/HIP BIOLOX/DELTA CERAM 12/31X75LN MIN3.5MM  [employer] [id number] Right 1 Implanted   STEM FEM/HIP AVENIR/COMPLETE HI/OFFST HA SZ4 - [id number] Implant STEM FEM/HIP AVENIR/COMPLETE HI/OFFST HA SZ4  [employer] [id number] Right 1 Implanted           Surgeon: Alphonse Messina MD      Assistant: Iris Sorenson     Assistants were needed for the procedure to help with patient positioning, prepping and draping, retraction throughout  the procedure and closure at the end of the case. Their assistance was vital to the success of this case.      Anesthesia: General     Estimated blood loss: 100 mL     Drains: None     Complications: None    Specimens: None     Disposition: The patient will be transferred back to the Med-Surg/Ortho floor postoperatively and will be weightbearing as tolerated.  The patient will be started on aspirin for DVT prophylaxis and will mobilize with physical therapy likely tomorrow.  Once medically stable patient will be discharged home or to an outpatient facility per the primary team and family.     Indications for procedure: This is a pleasant 78 yo female with longstanding hip pain due to severe osteoarthritis.  Patient has failed conservative management including anti-inflammatories, physical therapy, activity modification, and gait assistance.  Patient wishes to proceed with a right total hip arthroplasty.  I discussed the risks in detail preoperatively. Risks  include but are not limited to bleeding, infection, fracture, dislocation, blood clots, damage to nearby nerves or blood vessels, death, loosening, leg length inequality, infection from a blood transfusion, pneumonia, heart attack, stroke, liver or kidney failure, possible future procedures/surgeries. The patient and her  wish to proceed with surgery all questions were answered.     Description of procedure:     After identification in the holding area, consent was signed and the right hip was marked.  The patient was brought to the operating theater.  After induction of anesthesia, the patient received preoperative antibiotics and tranexamic acid.  The patient was placed in a [place] table in a supine position and the right lower extremity was prepped and draped free.  A timeout was performed identifying correct patient, surgical site, surgical procedure, antibiotics given and implants available.  Standard anterior approach to the hip was performed.  I used  a clean knife to incise the tensor fascia.  I coagulated the circumflex vessels with the Werewolf.  I retracted the rectus muscle medially coming down to the anterior capsule.  An inverted T-capsulotomy was made and the capsule was incised, tagged and saved for later repair.  I then continued with a femoral neck osteotomy with a sagittal saw which was made based upon preoperative templating and intraoperative measurements for leg length.  A corkscrew was inserted into the femoral head and the head was removed.  The head ball measured about 48 mm.  Attention was then turned to the acetabulum which was then exposed.  The foveal and labral contents were excised.  The C-arm was brought in in order to ream under x-ray guidance to ensure appropriate depth and position.  The acetabulum was reamed with hemispherical reamers to size 52 mm.  This brought me down to good bleeding cancellous bone.  There was superior wear of the acetabulum but we were able to medialize enough to get a good Cory spherical reaming.  The acetabulum was copiously irrigated normal sterile saline to remove any remaining debris.    A real 54 mm cup was impacted under fluoroscopic guidance in 40 degrees of abduction and 20 degrees of forward flexion/anteversion.  Good press-fit was achieved. 2 Dome screw were placed and position was verified using C arm.  The cup was copiously irrigated with normal sterile saline.  The final neutral liner for a 40 mm head ball was then impacted into place.  Attention was then turned back to the femur.  The hook was placed on the lateral femur.  The leg was externally rotated, extended and adducted.  The piriformis  capsule was released off the femur and the femur was elevated into the wound.  I used a rongeur to remove some lateral cortical neck bone.  I then lateralized with a rasp.  I then began sequentially broaching  up to a size 4 stem.  This brought me down to good cortical contact with rotational stability.   Calcar planer was used as needed.  I then trialed with a standard neck and a -3.5 head ball.  The hip was reduced.  Stability was checked and found to be fair I thought it be best to increase stability with final implants. Fluoroscopy confirmed good size/fill, position of the femoral stem, with near equal offset and leg lengths.  I felt we were slightly increased in length but did not have enough offset and given stability I felt appropriate final implant would be a high offset stem and maintain our current length.  the hip was dislocated.  Trial components were removed.  The final size 4 stem with high offset neck and 40 mm -3.5 head ball were impacted.  The hip was relocated.  Again stability was checked and was excellent. Final x-rays were taken and showed excellent implant position, leg lengths, offset and no intraoperative fracture.  The hip was copiously irrigated with Irrisept, dilute Betadine and pulse lavage with normal sterile saline.  The capsule was then closed with #2 Ethibond suture.  I injected the anterior capsule with about 10 mL of the hip ortho cocktail mix. The circumflex vessels were recoagulated with the werewolf. Excellent hemostasis was maintained.  Deep fascia was closed using a running strata fix and 0 Vicryl suture.  Additional hip Ortho cocktail mix was administered and deep subcutaneous tissues were closed using 2-0 Vicryl and 3-0 Stratafix was used for the skin.  Exofin fusion was placed on the incision.  Sterile dressings were applied prior to drapes being removed.   All sponge, needles, and instrument counts were correct at the conclusion of the procedure. The patient tolerated procedure well and was transferred from the operating room to the PACU vital signs stable.

## 2025-03-05 NOTE — ANESTHESIA POSTPROCEDURE EVALUATION
Patient: Rita Hendrix    Procedure Summary       Date: 03/05/25 Room / Location:  [facility] [address] [place] [address] [facility]    Anesthesia Start: 0655 Anesthesia Stop: 0915    Procedure: Right anterior total hip arthroplasty (Right: Hip) Diagnosis:       Primary osteoarthritis of right hip      (Primary osteoarthritis of right hip [M16.11])    Surgeons: Alphonse Messina MD Provider: Dario Birmingham MD    Anesthesia Type: general ASA Status: 3            Anesthesia Type: general    Vitals  Vitals Value Taken Time   /77 03/05/25 1215   Temp 36.4 °C (97.6 °F) 03/05/25 0910   Pulse 67 03/05/25 1224   Resp 16 03/05/25 1200   SpO2 100 % 03/05/25 1224   Vitals shown include unfiled device data.        Post Anesthesia Care and Evaluation    No anesthesia care post op

## 2025-03-05 NOTE — THERAPY EVALUATION
Patient Name: Rita Hendrix  : 1945    MRN: [id number]                              Today's Date: 3/5/2025       Admit Date: 3/5/2025    Visit Dx:     ICD-10-CM ICD-9-CM   1. Primary osteoarthritis of right hip  M16.11 715.15     Patient Active Problem List   Diagnosis    Hypothyroidism    Mixed hyperlipidemia    Purpura senilis    Anxiety    Itching    Other chronic pain    Primary osteoarthritis of right hip     Past Medical History:   Diagnosis Date    Anxiety     Arthritis     COVID 2022    Headache     Hip pain     [place] (hard of hearing)     Hypertension     Hypothyroidism     Migraines     Radius fracture      Past Surgical History:   Procedure Laterality Date    EYE SURGERY      HERNIA REPAIR        General Information       Row Name 25 1451          Physical Therapy Time and Intention    Document Type evaluation  -ST     Mode of Treatment physical therapy  -       Row Name 25 145          General Information    Patient Profile Reviewed yes  -ST     Prior Level of Function independent:  -ST     Existing Precautions/Restrictions fall;hip, anterior;right  -ST     Barriers to Rehab medically complex  -       Row Name 25 1451          Living Environment    People in Home spouse  -ST       Row Name 25 1451          Home Main Entrance    Number of Stairs, Main Entrance none  -ST       Row Name 25 1451          Stairs Within Home, Primary    Number of Stairs, Within Home, Primary one  -ST     Stairs Comment, Within Home, Primary sunken living room.  -ST       Row Name 25 1451          Cognition    Orientation Status (Cognition) oriented x 3  -ST       Row Name 25 145          Safety Issues/Impairments Affecting Functional Mobility    Safety Issues Affecting Function (Mobility) awareness of need for assistance  -ST     Impairments Affecting Function (Mobility) balance;endurance/activity tolerance;postural/trunk control;pain;strength;range of motion (ROM)   -ST     Comment, Safety Issues/Impairments (Mobility) gait belt, nonskid socks donned  -ST               User Key  (r) = Recorded By, (t) = Taken By, (c) = Cosigned By      Initials Name Provider Type    January Paul PT Physical Therapist                   Mobility       Row Name 03/05/25 1452          Bed Mobility    Comment, (Bed Mobility) [facility] at start and end of session  -ST       Row Name 03/05/25 1452          Sit-Stand Transfer    Sit-Stand [place] (Transfers) verbal cues;contact guard  -ST     Assistive Device (Sit-Stand Transfers) walker, front-wheeled  -ST     Comment, (Sit-Stand Transfer) cues for hand placement, especially when sitting down  -ST       Row Name 03/05/25 1452          Gait/Stairs (Locomotion)    [place] Level (Gait) verbal cues;contact guard  -ST     Assistive Device (Gait) walker, front-wheeled  -ST     Patient was able to Ambulate yes  -ST     Distance in Feet (Gait) 100  -ST     Deviations/Abnormal Patterns (Gait) gait speed decreased;stride length decreased;diana decreased;antalgic  -ST     Bilateral Gait Deviations forward flexed posture;heel strike decreased  -ST     Right Sided Gait Deviations weight shift ability decreased  -ST     [place] Level (Stairs) verbal cues;contact guard  -ST     Handrail Location (Stairs) both sides  -ST     Number of Steps (Stairs) 3  -ST     Ascending Technique (Stairs) step-to-step  -ST     Descending Technique (Stairs) step-to-step  -ST     Comment, (Gait/Stairs) also practiced stepping down with rwx to simulate living room. occasional step through pattern noted  -ST       Row Name 03/05/25 1452          Mobility    Extremity Weight-bearing Status right lower extremity  -ST     Right Lower Extremity (Weight-bearing Status) weight-bearing as tolerated (WBAT)  -ST               User Key  (r) = Recorded By, (t) = Taken By, (c) = Cosigned By      Initials Name Provider Type    January Paul PT Physical Therapist                    Obj/Interventions       Row Name 03/05/25 1453          Range of Motion Comprehensive    Comment, General Range of Motion bilat LE WFL; R LE with expected post op deficits  -       Row Name 03/05/25 1453          Strength Comprehensive (MMT)    Comment, General Manual Muscle Testing (MMT) Assessment bilat LE WFL; R LE with expected post op deficits  -       Row Name 03/05/25 1453          Motor Skills    Therapeutic Exercise --  instructed in AP and heel slides as part of NIC protocol  -       Row Name 03/05/25 1453          Balance    Comment, Balance no overt LOB or instability. VC for sequencing at times.  -ST               User Key  (r) = Recorded By, (t) = Taken By, (c) = Cosigned By      Initials Name Provider Type    January Paul PT Physical Therapist                   Goals/Plan    No documentation.                  Clinical Impression       Row Name 03/05/25 1454          Pain    Pain Location hip  -ST     Pain Side/Orientation right  -ST     Pain Management Interventions exercise or physical activity utilized  -ST     Response to Pain Interventions activity participation with tolerable pain  -ST     Pre/Posttreatment Pain Comment grimacing with transfers, does not rate  -ST     Pain Intervention(s) Repositioned  -       Row Name 03/05/25 1454          Plan of Care Review    Plan of Care Reviewed With patient;spouse  -ST     Outcome Evaluation Pt is 78 y/o F admitted to [facility] on 3/5/25 for R NIC, anterior approach, she is POD0. PMH: HTN, hypothyroidism, migraines. At baseline pt is from home with spouse, no MARIBELL but sunken living room, she is indep with mobility prior to sx. Pt currently demos CGA for transfers, ambulation up to 100' with rwx, and 3 step negotiation. Instructed in use of rwx for step in/out of living room. Education provided regarding sleeping positions, getting in/out of bed, and hip precautions. Pt demos mobility below baseline and therefore would benefit from  acute skilled PT to address functional mobility deficits. Anticipate d/c home with assist and home health.  -       Row Name 03/05/25 1454          Therapy Assessment/Plan (PT)    Rehab Potential (PT) good  -ST     Criteria for Skilled Interventions Met (PT) yes  -ST     Therapy Frequency (PT) daily  -ST     Predicted Duration of Therapy Intervention (PT) 1 week  -       Row Name 03/05/25 1454          Positioning and Restraints    Pre-Treatment Position sitting in chair/recliner  -ST     Post Treatment Position chair  -ST     In Chair reclined;call light within reach;encouraged to call for assist;with family/caregiver  -               User Key  (r) = Recorded By, (t) = Taken By, (c) = Cosigned By      Initials Name Provider Type    January Paul PT Physical Therapist                   Outcome Measures       Row Name 03/05/25 1301          5 Meter Walk    Walk 1 15 seconds  -       Row Name 03/05/25 1455          How much help from another person do you currently need...    Turning from your back to your side while in flat bed without using bedrails? 4  -ST     Moving from lying on back to sitting on the side of a flat bed without bedrails? 3  -ST     Moving to and from a bed to a chair (including a wheelchair)? 3  -ST     Standing up from a chair using your arms (e.g., wheelchair, bedside chair)? 3  -ST     Climbing 3-5 steps with a railing? 3  -ST     To walk in hospital room? 3  -ST     AM-PAC 6 Clicks Score (PT) 19  -ST     Highest Level of Mobility Goal 6 --> Walk 10 steps or more  -       Row Name 03/05/25 2895          Functional Assessment    Outcome Measure Options AM-PAC 6 Clicks Basic Mobility (PT)  -ST               User Key  (r) = Recorded By, (t) = Taken By, (c) = Cosigned By      Initials Name Provider Type    Alexandra Jeronimo RN Registered Nurse    January Paul PT Physical Therapist                                 Physical Therapy Education        No education to  display                  PT Recommendation and Plan     Outcome Evaluation: Pt is 80 y/o F admitted to [facility] on 3/5/25 for R NIC, anterior approach, she is POD0. PMH: HTN, hypothyroidism, migraines. At baseline pt is from home with spouse, no MARIBELL but sunken living room, she is indep with mobility prior to sx. Pt currently demos CGA for transfers, ambulation up to 100' with rwx, and 3 step negotiation. Instructed in use of rwx for step in/out of living room. Education provided regarding sleeping positions, getting in/out of bed, and hip precautions. Pt demos mobility below baseline and therefore would benefit from acute skilled PT to address functional mobility deficits. Anticipate d/c home with assist and home health.     Time Calculation:         PT Charges       Row Name 03/05/25 1456             Time Calculation    Start Time 1331  -ST      Stop Time 1352  -ST      Time Calculation (min) 21 min  -ST      PT Received On 03/05/25  -ST      PT - Next Appointment 03/06/25  -ST      PT Goal Re-Cert Due Date 03/12/25  -ST         Time Calculation- PT    Total Timed Code Minutes- PT 0 minute(s)  -ST                User Key  (r) = Recorded By, (t) = Taken By, (c) = Cosigned By      Initials Name Provider Type    ST January Doyle, PT Physical Therapist                  Therapy Charges for Today       Code Description Service Date Service Provider Modifiers Qty    [id number] HC PT EVAL MOD COMPLEXITY 2 3/5/2025 January Doyle PT GP 1            PT G-Codes  Outcome Measure Options: AM-PAC 6 Clicks Basic Mobility (PT)  AM-PAC 6 Clicks Score (PT): 19  PT Discharge Summary  Anticipated Discharge Disposition (PT): home with assist, home with home health    aJnuary Doyle PT  3/5/2025

## 2025-03-05 NOTE — CASE MANAGEMENT/SOCIAL WORK
Continued Stay Note  Psychiatric     Patient Name: Rita Hendrix  MRN: [id number]  Today's Date: 3/5/2025    Admit Date: 3/5/2025    Plan: She is set up with [facility] in [place] for start HH then transition to OP. I called to verify today and they have her set up to start HH tomorrow 3/6/2025   Discharge Plan       Row Name 03/05/25 1329       Plan    Plan She is set up with [facility] in [place] for start HH then transition to OP. I called to verify today and they have her set up to start HH tomorrow 3/6/2025                   Discharge Codes    No documentation.                 Expected Discharge Date and Time       Expected Discharge Date Expected Discharge Time    Mar 5, 2025               Shannon Epley, RN

## 2025-03-05 NOTE — ANESTHESIA PROCEDURE NOTES
Airway  Urgency: elective    Date/Time: 3/5/2025 7:06 AM  Airway not difficult    General Information and Staff    Patient location during procedure: OR  CRNA/CAA: Kriss Whitfield CRNA    Indications and Patient Condition  Indications for airway management: airway protection    Preoxygenated: yes  MILS not maintained throughout  Mask difficulty assessment: 1 - vent by mask    Final Airway Details  Final airway type: endotracheal airway      Successful airway: ETT  Cuffed: yes   Successful intubation technique: direct laryngoscopy  Endotracheal tube insertion site: oral  Blade: Stefano  Blade size: 3  ETT size (mm): 7.0  Cormack-Lehane Classification: grade I - full view of glottis  Placement verified by: chest auscultation and capnometry   Measured from: lips  ETT/EBT  to lips (cm): 21  Number of attempts at approach: 1  Assessment: lips, teeth, and gum same as pre-op and atraumatic intubation    Additional Comments  Dentition intact and unchanged. CBEBS.  +ETCO2.

## 2025-03-05 NOTE — ANESTHESIA PREPROCEDURE EVALUATION
Anesthesia Evaluation     NPO Solid Status: > 8 hours  NPO Liquid Status: > 4 hours           Airway   Mallampati: I  No difficulty expected  Dental      Pulmonary    Cardiovascular     (+) hypertension, PVD, hyperlipidemia      Neuro/Psych  (+) headaches, psychiatric history  GI/Hepatic/Renal/Endo    (+) thyroid problem     Musculoskeletal     Abdominal    Substance History   (+) drug use     OB/GYN          Other   arthritis,                 Anesthesia Plan    ASA 3     general     intravenous induction     Anesthetic plan, risks, benefits, and alternatives have been provided, discussed and informed consent has been obtained with: patient.    CODE STATUS:

## 2025-03-05 NOTE — DISCHARGE SUMMARY
Patient Name: Rita Hendrix  Patient YOB: 1945    Date of Admission:  3/5/2025  Date of Discharge:  3/5/2025  Discharge Diagnosis: MD ARTHRP ACETBLR/PROX FEM PROSTC AGRFT/ALGRFT [14325] (Right anterior total hip arthroplasty)  Presenting Problem/History of Present Illness: Primary osteoarthritis of right hip [M16.11]  Admitting Physician: Dr Alphonse Messina  Consults:   Consults       No orders found from 2/4/2025 to 3/6/2025.            DETAILS OF HOSPITAL STAY:  Patient is a 79 y.o. female  underwent an uncomplicated hospital stay.  Patient did well with physical therapy and was ambulating well without problems.  On the day of discharge the wound was clean, dry and intact and calf was soft and nontender and Homans sign was negative.  Patient was tolerating  without problems.  Patient will be discharged home.    Condition on Discharge:  Stable    Vital Signs  Temp:  [97.6 °F (36.4 °C)-98 °F (36.7 °C)] 97.6 °F (36.4 °C)  Heart Rate:  [63-83] 66  Resp:  [11-20] 16  BP: (108-157)/(63-85) 110/64    LABS:   No visits with results within 3 Day(s) from this visit.   Latest known visit with results is:   Pre-Admission Testing on 02/20/2025   Component Date Value Ref Range Status    WBC 02/20/2025 8.20  3.40 - 10.80 10*3/mm3 Final    RBC 02/20/2025 4.75  3.77 - 5.28 10*6/mm3 Final    Hemoglobin 02/20/2025 15.1  12.0 - 15.9 g/dL Final    Hematocrit 02/20/2025 44.5  34.0 - 46.6 % Final    MCV 02/20/2025 93.7  79.0 - 97.0 fL Final    MCH 02/20/2025 31.8  26.6 - 33.0 pg Final    MCHC 02/20/2025 33.9  31.5 - 35.7 g/dL Final    RDW 02/20/2025 12.7  12.3 - 15.4 % Final    RDW-SD 02/20/2025 43.4  37.0 - 54.0 fl Final    MPV 02/20/2025 9.9  6.0 - 12.0 fL Final    Platelets 02/20/2025 370  140 - 450 10*3/mm3 Final    Glucose 02/20/2025 122 (H)  65 - 99 mg/dL Final    BUN 02/20/2025 20  8 - 23 mg/dL Final    Creatinine 02/20/2025 0.78  0.57 - 1.00 mg/dL Final    Sodium 02/20/2025 139  136 - 145 mmol/L Final     Potassium 02/20/2025 3.8  3.5 - 5.2 mmol/L Final    Chloride 02/20/2025 102  98 - 107 mmol/L Final    CO2 02/20/2025 25.7  22.0 - 29.0 mmol/L Final    Calcium 02/20/2025 9.4  8.6 - 10.5 mg/dL Final    BUN/Creatinine Ratio 02/20/2025 25.6 (H)  7.0 - 25.0 Final    Anion Gap 02/20/2025 11.3  5.0 - 15.0 mmol/L Final    eGFR 02/20/2025 77.4  >60.0 mL/min/1.73 Final    Hemoglobin A1C 02/20/2025 5.60  4.80 - 5.60 % Final    ABO Type 02/20/2025 AB   Final    RH type 02/20/2025 Positive   Final    Antibody Screen 02/20/2025 Negative   Final    T&S Expiration Date 02/20/2025 3/6/2025 11:59:00 PM   Final    Protime 02/20/2025 13.5  11.7 - 14.2 Seconds Final    INR 02/20/2025 1.03  0.90 - 1.10 Final       No results found.        Discharge Medications     Discharge Medications        New Medications        Instructions Start Date   aspirin 81 MG EC tablet   81 mg, Oral, 2 Times Daily, Start AM the next day following surgery.  Take 1 tablet every 12 hours for 2 weeks followed by 1 tablet daily for 4 weeks   Start Date: March 6, 2025     docusate sodium 100 MG capsule  Commonly known as: COLACE   100 mg, Oral, 2 Times Daily      HYDROcodone-acetaminophen 7.5-325 MG per tablet  Commonly known as: NORCO   1 tablet, Oral, Every 4 Hours PRN, May take 2 tabs po q 4 hours for severe pain if needed      meloxicam 15 MG tablet  Commonly known as: MOBIC   7.5 mg, Oral, Daily      ondansetron 4 MG tablet  Commonly known as: Zofran   4 mg, Oral, Every 8 Hours PRN      pantoprazole 40 MG EC tablet  Commonly known as: PROTONIX   40 mg, Oral, Daily      polyethylene glycol 17 GM/SCOOP powder  Commonly known as: MIRALAX   17 g, Oral, Daily, Dispense 7 day supply             Changes to Medications        Instructions Start Date   levothyroxine 75 MCG tablet  Commonly known as: SYNTHROID, LEVOTHROID  What changed: when to take this   Take 1 tablet by mouth once daily      SUMAtriptan 50 MG tablet  Commonly known as: IMITREX  What changed:   how  much to take  how to take this  when to take this  reasons to take this   Take one tablet at onset of headache. May repeat dose one time in 2 hours if headache not relieved.             Continue These Medications        Instructions Start Date   hydrOXYzine 25 MG tablet  Commonly known as: ATARAX   25 mg, Oral, Every 8 Hours PRN      losartan 25 MG tablet  Commonly known as: COZAAR   Take 1 tablet by mouth Every Night. PT TO HOLD 24 HOUR PRIOR TO SURGERY      magnesium oxide 400 (241.3 Mg) MG tablet tablet  Commonly known as: MAGOX   400 mg, Daily      metoprolol succinate XL 25 MG 24 hr tablet  Commonly known as: TOPROL-XL   25 mg, Daily      venlafaxine XR 37.5 MG 24 hr capsule  Commonly known as: EFFEXOR-XR   37.5 mg, Oral, Daily      venlafaxine XR 75 MG 24 hr capsule  Commonly known as: EFFEXOR-XR   75 mg, Oral, Daily      vitamin D3 125 MCG (5000 UT) capsule capsule   5,000 Units, Daily             Stop These Medications      multivitamin with minerals tablet tablet              Activity at Discharge:   Activity Instructions       Discharge Activity      [facility] - Orthopedics    Total Hip Replacement Discharge Instructions:    I. ACTIVITIES:  1. Exercises:  Complete exercise program as taught by the hospital physical therapist 2 times per day  Exercise program will be advanced by the physical therapist  During the day be up ambulating every 2 hours (while awake) for short distances  Complete the ankle pump exercises at least 10 times per hour (while awake)  Elevate legs when in bed and for at least 30 minutes during the day.Use cold packs 20-30 minutes approximately 5 times per day. This should be done before and after completing your exercises and at any time you are experiencing pain/ stiffness in your operative extremity.    2. Activities of Daily Living:  No tub baths, hot tubs, or swimming pools for 4 weeks  May shower and let water run over the incision on post-operative day #3 if no  drainage. Do not scrub or rub the incision. Simply let the water run over the incision and pat dry.    II. Restrictions  Continue hip precautions as taught at the hospital  Your surgeon will discuss with you when you will be able to drive again.  Weight bearing is as tolerated  First week stay inside on even terrain. May go up and down stairs one stair at a time utilizing the hand rail once cleared by physical therapy to do so.  After one week, you may venture outside (if cleared to do so by physical therapist).    III. Precautions:  Everyone that comes near you should wash their hands  No elective dental, genital-urinary, or colon procedures or surgical procedures for 12 weeks after surgery unless absolutely necessary.   If dental work or surgical procedure is deemed absolutely necessary, you will need to contact your surgeon as you will need to take antibiotics 1 hour prior to any dental work (including teeth cleanings).  Please discuss with your surgeon prophylactic antibiotics as the length of time this intervention will be necessary for you varies with each patient's health history and situation.  Avoid sick people. If you must be around someone who is ill, they should wear a mask.  Avoid visits to the Emergency Room or Urgent Care unless you are having a life threatening event.   If ordered stockings are to be placed on in the morning and removed at night. Monitor the stockings to ensure that any swelling is not causing the stockings to become too tight. In this case, remove stockings immediately.    IV. INCISION CARE:  Do not change the dressing unless instructed by your physician.  The dressing should be left in place until seen in the office.  The suction unit stops functioning (typically 7 days) and battery may be removed, but leave dressing intact.   The dressing is waterproof and the patient may shower immediately following discharge. If the dressing becomes disloged or saturated it should be changed.  Please refer to the TONNY information sheet if you have any questions about the dressing.  If you have a home health nurse or therapist they can be contacted to assist with dressing change or repair. You may also call the [facility] dressing hotline for questions related to the dressing ([phone]). If there still other problems or questions related to the dressing despite these measures then you can contact Susana at our office [phone].     Wash your hands prior to dressing changes  No creams or ointments to the incision  Do not touch or pick at the incision  Check incision every day and notify surgeon immediately if any of the following signs or symptoms are noted:  Increase in redness  Increase in swelling around the incision and of the entire extremity  Increase in pain  Drainage oozing from the incision  Pulling apart of the edges of the incision  Increase in overall body temperature (greater than 100.5 degrees)  Your surgeon will instruct you regarding suture or staple removal    V. Medications:   1. Anticoagulants: You will be discharged on an anticoagulant. This is a prophylactic medication that helps prevent blood clots during your post-operative period. The type and length of dosage varies based on your individual needs, procedure performed, and surgeon's preference.  While taking the anticoagulant, you should avoid taking any additional aspirin, ibuprofen (Advil or Motrin), Aleve (Naprosyn) or other non-steroidal anti-inflammatory medications.   Notify surgeon immediately if any surekha bleeding is noted in the urine, stool, emesis, or from the nose or the incision. Blood in the stool will often appear as black rather than red. Blood in urine may appear as pink. Blood in emesis may appear as brown/black like coffee grounds.  You will need to apply pressure for longer periods of time to any cuts or abrasions to stop bleeding  Avoid alcohol while taking anticoagulants    2. Stool Softeners: You will be at  greater risk of constipation after surgery due to being less mobile and the pain medications.   Take stool softeners as instructed by your surgeon while on pain medications. Over the counter Colace 100 mg 1-2 capsules twice daily.   If stools become too loose or too frequent, please decreases the dosage or stop the stool softener.  If constipation occurs despite use of stool softeners, you are to continue the stool softeners and add a laxative (Milk of Magnesia 1 ounce daily as needed)  Drink plenty of fluids, and eat fruits and vegetables during your recovery time    3. Pain Medications utilized after surgery are narcotics and the law requires that the following information be given to all patients that are prescribed narcotics:  CLASSIFICATION: Pain medications are called Opioids and are narcotics  LEGALITIES: It is illegal to share narcotics with others and to drive within 24 hours of taking narcotics  POTENTIAL SIDE EFFECTS: Potential side effects of opioids include: nausea, vomiting, itching, dizziness, drowsiness, dry mouth, constipation, and difficulty urinating.  POTENTIAL ADVERSE EFFECTS:   Opioid tolerance can develop with use of pain medications and this simply means that it requires more and more of the medication to control pain; however, this is seen more in patients that use opioids for longer periods of time.  Opioid dependence can develop with use of Opioids and this simply means that to stop the medication can cause withdrawal symptoms; however, this is seen with patients that use Opioids for longer periods of time.  Opioid addiction can develop with use of Opioids and the incidence of this is very unlikely in patients who take the medications as ordered and stop the medications as instructed.  Opioid overdose can be dangerous, but is unlikely when the medication is taken as ordered and stopped when ordered. It is important not to mix opioids with alcohol or with and type of sedative such as  Benadryl as this can lead to over sedation and respiratory difficulty.  DOSAGE:   Pain medications will need to be taken consistently for the first week to decrease pain and promote adequate pain relief and participation in physical therapy.  After the initial surgical pain begins to resolve, you may begin to decrease the pain medication. By the end of 6 weeks, you should be off of pain medications.  Refills will not be given by the office during evening hours, on weekends, or after 6 weeks post-op.  To seek refills on pain medications during the initial 6 week post-operative period, you must call the office 48 hours in advance to request the refill. The office will then notify you when to  the prescription. DO NOT wait until you are out of the medication to request a refill.    VI. FOLLOW-UP VISITS:  You will need to follow up in the office with Dr. Alphonse Messina in  2 weeks. Please call this number [phone] to schedule this appointment.  If you have any concerns or suspected complications prior to your follow up visit, please call your surgeons office. Do not wait until your appointment time if you suspect complications. These will need to be addressed in the office promptly.    Patient May Shower; No Tub Baths, Pools or Hot Tubs      POD #3    Weight Bearing As Tolerated              Discharge Instructions:   1)  Patient is to continue with physical therapy exercises daily and continue working with the physical therapist as ordered.  2)  Follow Anterior hip precautions.   3)  Patient may weight bear as tolerated.   4)  Apply ice regularly. You may ice for long periods of time as long as ice is not directly on the skin. Patient instructed on frequent calf pumping exercises.  Patient also instructed on incentive spirometer during hospitalization and encouraged to continue to use at home regularly.   5)  The dressing should be left in place. If waterproof dressing is intact the patient may shower  immediately following discharge. If the dressing becomes disloged or saturated it should be changed. Please refer to the TONNY information sheet if you have any questions about the dressing.  If you have a home health nurse or therapist they can be contacted to assist with dressing change or repair. You may also call the [facility] dressing hotline for questions related to the dressing ([phone]). If there are still other problems or questions related to the dressing despite these measures then you can contact Susana at our office [phone].   6)  Follow up appointment in 2 weeks - patient to call the office at [phone] to schedule. 7)  Patient will be discharged on aspirin 81mg BID x 2 weeks, then daily x 4 weeks    Complete Discharge Diagnosis:    Primary osteoarthritis of right hip          Follow-up Appointments  Future Appointments   Date Time Provider Department Center   3/19/2025 11:20 AM Alphonse Messina MD [facility] [address] [facility]   8/27/2025  2:15 PM Tammi Sidhu MD [facility]              Alphonse Messina MD  03/05/25  15:58 EST

## 2025-03-07 ENCOUNTER — TELEPHONE (OUTPATIENT)
Dept: ORTHOPEDIC SURGERY | Facility: HOSPITAL | Age: 80
End: 2025-03-07
Payer: MEDICARE

## 2025-03-07 DIAGNOSIS — E03.8 OTHER SPECIFIED HYPOTHYROIDISM: ICD-10-CM

## 2025-03-07 RX ORDER — LEVOTHYROXINE SODIUM 75 UG/1
75 TABLET ORAL DAILY
Qty: 90 TABLET | Refills: 0 | Status: SHIPPED | OUTPATIENT
Start: 2025-03-07

## 2025-03-07 NOTE — TELEPHONE ENCOUNTER
William Wood called office regarding his spouse who is a patient Rita Hendrix. Patient is needing her medication of levothyroxine (Synthroid,levothroid) 75 MCG tablet. Take 1 tablet by mouth every morning Needs quantity on 90 tablet diagnosis Other specified hypothyroidism E03.8 . Please send medication to [email or web address] in [address] phone [phone] fax [phone]

## 2025-03-07 NOTE — TELEPHONE ENCOUNTER
Post op day 2  Discharge Instructions:  Ask patient about his or her discharge instructions  ?  Patient confirmed understanding   []  Further instruction needed   What, if any, recommendations, teaching, or interventions did you provide? Click or tap here to enter text.  Health status:  Pain controlled Yes   Does have some increased pain taking the medication regularly and it helps some but wears off before next dose is due.   Recommended interventions:  Yes  incision/dressing status   ?  Clean without redness, drainage, odor  []  Redness    []  Drainage - color Click or tap here to enter text.  []  Odor  TONNY - Green light blinking Yes  Difficulties urination No  Last BM 3/5/2025 (if no BM by day 3-recommend OTC suppository or fleets enema)  No BM, not taking the medication.   Medications:  ?Medications reviewed with patient/family/caregiver  Patient taking medications as prescribed?   Yes  If not taking medications as prescribed, note specific medicine(s) and reason for each:  Click or tap here to enter text.  Hospital Follow Up Plan:  Follow up Appointment with Orthopedic surgeon:  ?Has f/u appointment                []Scheduled f/u appointment  Home Care ordered at discharge?    Yes        Home Care started, or contact made?    Yes   If no, action taken:   DME obtained/used in home?         Yes   Using IS  Choose an item.   Other information: Ms. Hendrix said she is doing ok. She is having some increased pain today. Told her today was one of the worst. She is icing and elevating. She is up and walking. She said she hasn't been able to do the exercises today. PT has been out to see her. She is taking the pain medication regularly. She hasn't had a BM. Hasn't started the Miralax. Encouraged her to start that. She said she was having some nausea. Explained she was given Zofran should she need it for the nausea. Decreased appetite, has been taking medication on an empty stomach. Encouraged to eat something small  with the medications. She is having some trouble controlling her bladder. Explained that could be from the anesthesia and some of the medications and should improve in the next few days. Ms. Hendrix doesn't have any other questions for me at this time. She has my contact information should she need anything.

## 2025-03-07 NOTE — TELEPHONE ENCOUNTER
Rx Refill Note  Requested Prescriptions     Pending Prescriptions Disp Refills    levothyroxine (SYNTHROID, LEVOTHROID) 75 MCG tablet 90 tablet 0     Sig: Take 1 tablet by mouth Daily.      Last office visit with prescribing clinician: 1/15/2025   Last telemedicine visit with prescribing clinician: Visit date not found   Next office visit with prescribing clinician: 8/27/2025                         Would you like a call back once the refill request has been completed: [] Yes [] No    If the office needs to give you a call back, can they leave a voicemail: [] Yes [] No    Kalin Pollard MA  03/07/25, 14:42 EST

## 2025-03-09 DIAGNOSIS — E03.8 OTHER SPECIFIED HYPOTHYROIDISM: ICD-10-CM

## 2025-03-10 RX ORDER — LEVOTHYROXINE SODIUM 75 UG/1
75 TABLET ORAL DAILY
Qty: 90 TABLET | Refills: 0 | OUTPATIENT
Start: 2025-03-10

## 2025-03-11 ENCOUNTER — TELEPHONE (OUTPATIENT)
Dept: ORTHOPEDIC SURGERY | Facility: HOSPITAL | Age: 80
End: 2025-03-11
Payer: MEDICARE

## 2025-03-11 ENCOUNTER — TELEPHONE (OUTPATIENT)
Dept: ORTHOPEDIC SURGERY | Facility: CLINIC | Age: 80
End: 2025-03-11
Payer: MEDICARE

## 2025-03-11 NOTE — TELEPHONE ENCOUNTER
Called patient in regards to her TONNY battery. Since it has been seven days since surgery I instructed her on how to remove the TONNY battery and to throw away the battery. I then instructed the patient to tape off the end of the tubing of the dressing so water would not get into the tubing while the patient is showering. Patient voiced understanding.

## 2025-03-11 NOTE — TELEPHONE ENCOUNTER
Caller: Rita Hendrix    Relationship: Self    Best call back number: [phone]     What is the best time to reach you: ANYTIME    Who are you requesting to speak with (clinical staff, provider,  specific staff member): CLINICAL STAFF    What was the call regarding: POST HIP REPLACEMENT QUESTION. PATIENT STATED SHE THOUGHT DR. BUSTILLOS TOLD HER SHE COULD REMOVE THE DRAIN AFTER 1 WEEK POST OP BUT SHE JUST WANTED CLARIFICATION ON IF THAT WAS RIGHT AND OKAY FOR HER TO GO AHEAD AND REMOVE.    Is it okay if the provider responds through MyChart: CALL

## 2025-03-11 NOTE — TELEPHONE ENCOUNTER
Ms. Johnson called and left me a message. She had a question about a drainage box and when it could be removed. I attempted to call Ms. Hendrix back and left a message with TONNY instructions. She has my contact information should she have any further questions/concerns.

## 2025-03-13 DIAGNOSIS — F41.9 ANXIETY: ICD-10-CM

## 2025-03-13 RX ORDER — VENLAFAXINE HYDROCHLORIDE 75 MG/1
75 CAPSULE, EXTENDED RELEASE ORAL DAILY
Qty: 90 CAPSULE | Refills: 0 | Status: SHIPPED | OUTPATIENT
Start: 2025-03-13

## 2025-03-13 RX ORDER — VENLAFAXINE HYDROCHLORIDE 37.5 MG/1
37.5 CAPSULE, EXTENDED RELEASE ORAL DAILY
Qty: 90 CAPSULE | Refills: 3 | Status: SHIPPED | OUTPATIENT
Start: 2025-03-13

## 2025-03-13 NOTE — TELEPHONE ENCOUNTER
Caller: Simeon Rita J    Relationship: Self    Best call back number: [phone]    Requested Prescriptions:   Requested Prescriptions     Pending Prescriptions Disp Refills    venlafaxine XR (EFFEXOR-XR) 37.5 MG 24 hr capsule 90 capsule 3     Sig: Take 1 capsule by mouth Daily.    venlafaxine XR (EFFEXOR-XR) 75 MG 24 hr capsule 90 capsule 0     Sig: Take 1 capsule by mouth Daily.        Pharmacy where request should be sent: [id number] - [facility] [place], [place] - [address] AT [facility] [address] [facility] [address] - [phone]  - [phone] FX     Last office visit with prescribing clinician: 1/15/2025   Last telemedicine visit with prescribing clinician: Visit date not found   Next office visit with prescribing clinician: 8/27/2025     Shavonne Edward Rep   03/13/25 14:33 EDT

## 2025-03-19 ENCOUNTER — TELEPHONE (OUTPATIENT)
Dept: ORTHOPEDIC SURGERY | Facility: CLINIC | Age: 80
End: 2025-03-19

## 2025-03-19 ENCOUNTER — OFFICE VISIT (OUTPATIENT)
Dept: ORTHOPEDIC SURGERY | Facility: CLINIC | Age: 80
End: 2025-03-19
Payer: MEDICARE

## 2025-03-19 VITALS — WEIGHT: 151.5 LBS | HEIGHT: 66 IN | BODY MASS INDEX: 24.35 KG/M2 | TEMPERATURE: 98.4 F

## 2025-03-19 DIAGNOSIS — M16.11 PRIMARY OSTEOARTHRITIS OF RIGHT HIP: ICD-10-CM

## 2025-03-19 DIAGNOSIS — Z96.641 S/P TOTAL RIGHT HIP ARTHROPLASTY: Primary | ICD-10-CM

## 2025-03-19 PROCEDURE — 1159F MED LIST DOCD IN RCRD: CPT | Performed by: ORTHOPAEDIC SURGERY

## 2025-03-19 PROCEDURE — 73502 X-RAY EXAM HIP UNI 2-3 VIEWS: CPT | Performed by: ORTHOPAEDIC SURGERY

## 2025-03-19 PROCEDURE — 99024 POSTOP FOLLOW-UP VISIT: CPT | Performed by: ORTHOPAEDIC SURGERY

## 2025-03-19 PROCEDURE — 1160F RVW MEDS BY RX/DR IN RCRD: CPT | Performed by: ORTHOPAEDIC SURGERY

## 2025-03-19 RX ORDER — HYDROCODONE BITARTRATE AND ACETAMINOPHEN 7.5; 325 MG/1; MG/1
1 TABLET ORAL EVERY 6 HOURS PRN
Qty: 28 TABLET | Refills: 0 | Status: SHIPPED | OUTPATIENT
Start: 2025-03-19

## 2025-03-19 NOTE — TELEPHONE ENCOUNTER
Caller: SimeonRita    Relationship to patient: Self    Best call back number: [phone] (home)     Patient is needing: MS GILLESPIE HAD HIP SX ON 3/5/25 AND SHE WOULD LIKE TO KNOW WHEN SHE CAN CROSS HER LEGS AGAIN. PLEASE CALL HER BACK AND ADVISE

## 2025-03-19 NOTE — PROGRESS NOTES
Rita Hendrix : 1945 MRN: [id number] DATE: 3/19/2025    DIAGNOSIS: 2 week follow up right total hip     SUBJECTIVE:Patient returns today for 2 week follow up of right total hip replacement. Patient reports doing well with no unusual complaints. Appears to be progressing appropriately.    OBJECTIVE:   Exam:. The incision is healing appropriately. No sign of infection. Range of motion is progressing as expected. The calf is soft and nontender with a negative Homans sign.    DIAGNOSTIC STUDIES  Xrays: 2 views of the right hip (AP pelvis and lateral right hip) were ordered and reviewed for evaluation of recent hip replacement. They demonstrate a well positioned, well aligned hip replacement without complicating factors noted. In comparison with previous films there has been interval implant placement.    ASSESSMENT: 2 week status post right hip replacement.    PLAN: 1) Dressing removed and steri strips applied   2) PT exercises   3) Continue ice PRN   4) WBAT   5) aspirin 81 mg orally every day for 4 weeks   6) Follow up in 4 weeks with repeat Xrays of right hip (2views)    2-week total hip information packet given and discussed including dental and GI procedure antibiotic prophylaxis.     Alphonse Messina MD  3/19/2025

## 2025-04-03 NOTE — TELEPHONE ENCOUNTER
Caller: Rita Hendrix    Relationship: Self    Best call back number: [phone]     Requested Prescriptions:   Requested Prescriptions     Pending Prescriptions Disp Refills    losartan (COZAAR) 25 MG tablet       Sig: Take 1 tablet by mouth Every Night.        Pharmacy where request should be sent: [id number] - [place], [place] - [address] [facility] [address] AT [facility] [address] [facility] [address] - [phone]  - [phone] FX     Last office visit with prescribing clinician: 1/15/2025   Last telemedicine visit with prescribing clinician: Visit date not found   Next office visit with prescribing clinician: 8/27/2025     Additional details provided by patient: PATIENT IS USING A NEW PHARMACY     Does the patient have less than a 3 day supply:  [] Yes  [x] No    Would you like a call back once the refill request has been completed: [] Yes [x] No    If the office needs to give you a call back, can they leave a voicemail: [] Yes [x] No    Shavonne Matta Rep   04/03/25 11:21 EDT

## 2025-04-04 RX ORDER — LOSARTAN POTASSIUM 25 MG/1
25 TABLET ORAL NIGHTLY
Qty: 90 TABLET | Refills: 1 | Status: SHIPPED | OUTPATIENT
Start: 2025-04-04 | End: 2025-10-01

## 2025-04-18 ENCOUNTER — OFFICE VISIT (OUTPATIENT)
Dept: ORTHOPEDIC SURGERY | Facility: CLINIC | Age: 80
End: 2025-04-18
Payer: MEDICARE

## 2025-04-18 VITALS — WEIGHT: 149.2 LBS | TEMPERATURE: 97.7 F | HEIGHT: 67 IN | BODY MASS INDEX: 23.42 KG/M2

## 2025-04-18 DIAGNOSIS — R52 PAIN: ICD-10-CM

## 2025-04-18 DIAGNOSIS — Z96.641 S/P TOTAL RIGHT HIP ARTHROPLASTY: Primary | ICD-10-CM

## 2025-04-18 RX ORDER — METOPROLOL SUCCINATE 25 MG/1
25 TABLET, EXTENDED RELEASE ORAL DAILY
Qty: 90 TABLET | Refills: 1 | Status: SHIPPED | OUTPATIENT
Start: 2025-04-18

## 2025-04-18 RX ORDER — ASPIRIN 81 MG/1
TABLET ORAL
COMMUNITY
Start: 2025-03-05

## 2025-04-18 NOTE — TELEPHONE ENCOUNTER
Caller: Zach Hendrixtopher MERLOS    Relationship: Self    Best call back number: [phone]     Requested Prescriptions:   Requested Prescriptions     Pending Prescriptions Disp Refills    metoprolol succinate XL (TOPROL-XL) 25 MG 24 hr tablet       Sig: Take 1 tablet by mouth Daily.        Pharmacy where request should be sent: [id number] - [place], [place] - [address] AT [facility] [address] [place] [address] - [phone] [facility] [phone] FX     Last office visit with prescribing clinician: 1/15/2025   Last telemedicine visit with prescribing clinician: Visit date not found   Next office visit with prescribing clinician: 8/27/2025     Additional details provided by patient: PATIENT STATES THAT SHE IS CHANGING PHARMACIES AND NEEDS A NEW PRESCRIPTION SENT TO THIS PHARMACY.     Does the patient have less than a 3 day supply:  [] Yes  [x] No    Would you like a call back once the refill request has been completed: [] Yes [x] No    If the office needs to give you a call back, can they leave a voicemail: [] Yes [x] No    Shavonne Tijerina Rep   04/18/25 09:29 EDT

## 2025-04-18 NOTE — PROGRESS NOTES
Rita Hendrix : 1945 MRN: [id number] DATE: 2025    DIAGNOSIS: 6 week follow up right total hip     SUBJECTIVE:Patient returns today for 6 week follow up of right total hip replacement. Patient reports doing well with no unusual complaints. Appears to be progressing appropriately and is off a cane    OBJECTIVE:   Exam:. The incision is healed. No sign of infection. Range of motion is progressing as expected. The calf is soft and nontender with a negative Homans sign. Strength progressing    DIAGNOSTIC STUDIES  Xrays: 2 views of the right  hip (AP pelvis and lateral right hip) were ordered and reviewed for evaluation of recent hip replacement. They demonstrate a well positioned, well aligned hip replacement without complicating factors noted. In comparison with previous films there has been interval implant placement.    ASSESSMENT: 6 week status post right  hip replacement.    PLAN: 1) Activity as tolerated   2) Continue hip strengthening exercises    3) ok to stop DVT ppx    4) Follow up in 8 weeks with repeat Xrays of  hip (2views AP Pelvis and lateral  right hip)    Antibiotics before dental and GI procedures discussed.     Alphonse Messina MD  2025

## 2025-05-31 DIAGNOSIS — E03.8 OTHER SPECIFIED HYPOTHYROIDISM: ICD-10-CM

## 2025-05-31 DIAGNOSIS — F41.9 ANXIETY: ICD-10-CM

## 2025-06-02 RX ORDER — VENLAFAXINE HYDROCHLORIDE 37.5 MG/1
37.5 CAPSULE, EXTENDED RELEASE ORAL DAILY
Qty: 90 CAPSULE | Refills: 3 | Status: SHIPPED | OUTPATIENT
Start: 2025-06-02

## 2025-06-02 RX ORDER — LEVOTHYROXINE SODIUM 75 UG/1
75 TABLET ORAL DAILY
Qty: 90 TABLET | Refills: 0 | Status: SHIPPED | OUTPATIENT
Start: 2025-06-02 | End: 2025-06-03 | Stop reason: SDUPTHER

## 2025-06-02 NOTE — TELEPHONE ENCOUNTER
Caller: SimeonZachtopher MERLOS    Relationship: Self    Best call back number: [phone]     Requested Prescriptions:   Requested Prescriptions     Pending Prescriptions Disp Refills    levothyroxine (SYNTHROID, LEVOTHROID) 75 MCG tablet [Pharmacy Med Name: LEVOTHYROXINE 0.075MG (75MCG) TABS] 90 tablet 0     Sig: TAKE 1 TABLET BY MOUTH DAILY    venlafaxine XR (EFFEXOR-XR) 37.5 MG 24 hr capsule 90 capsule 3     Sig: Take 1 capsule by mouth Daily.        Pharmacy where request should be sent: [facility] #[id number] - [facility] [place], [place] - [address] - [phone]  - [phone] FX     Last office visit with prescribing clinician: 1/15/2025   Last telemedicine visit with prescribing clinician: Visit date not found   Next office visit with prescribing clinician: 8/27/2025       Does the patient have less than a 3 day supply:  [x] Yes  [] No

## 2025-06-03 DIAGNOSIS — E03.8 OTHER SPECIFIED HYPOTHYROIDISM: ICD-10-CM

## 2025-06-03 RX ORDER — LEVOTHYROXINE SODIUM 75 UG/1
75 TABLET ORAL DAILY
Qty: 90 TABLET | Refills: 1 | Status: SHIPPED | OUTPATIENT
Start: 2025-06-03 | End: 2025-06-03 | Stop reason: SDUPTHER

## 2025-06-07 DIAGNOSIS — E03.8 OTHER SPECIFIED HYPOTHYROIDISM: ICD-10-CM

## 2025-06-09 RX ORDER — LEVOTHYROXINE SODIUM 75 UG/1
75 TABLET ORAL DAILY
Qty: 90 TABLET | Refills: 0 | OUTPATIENT
Start: 2025-06-09

## 2025-06-13 DIAGNOSIS — F41.9 ANXIETY: ICD-10-CM

## 2025-06-13 RX ORDER — VENLAFAXINE HYDROCHLORIDE 75 MG/1
75 CAPSULE, EXTENDED RELEASE ORAL DAILY
Qty: 90 CAPSULE | Refills: 0 | Status: SHIPPED | OUTPATIENT
Start: 2025-06-13

## 2025-06-19 ENCOUNTER — OFFICE VISIT (OUTPATIENT)
Dept: ORTHOPEDIC SURGERY | Facility: CLINIC | Age: 80
End: 2025-06-19
Payer: MEDICARE

## 2025-06-19 VITALS — BODY MASS INDEX: 23.42 KG/M2 | HEIGHT: 67 IN | WEIGHT: 149.2 LBS | TEMPERATURE: 98.3 F

## 2025-06-19 DIAGNOSIS — Z96.641 S/P TOTAL RIGHT HIP ARTHROPLASTY: Primary | ICD-10-CM

## 2025-06-19 DIAGNOSIS — R52 PAIN: ICD-10-CM

## 2025-06-19 NOTE — PROGRESS NOTES
Rita Hendrix : 1945 MRN: [id number] DATE: 2025    DIAGNOSIS: Right total hip arthroplasty    SUBJECTIVE:Patient returns today for 3.5 month follow up of right total hip replacement. Patient reports doing well with no unusual complaints. Appears to be progressing appropriately and is off a cane    OBJECTIVE:   Exam:. The incision is healed. No sign of infection. Range of motion is progressing as expected. The calf is soft and nontender with a negative Homans sign. Strength progressing    DIAGNOSTIC STUDIES  Xrays: 2 views of the right hip (AP pelvis and lateral right hip) were ordered and reviewed for evaluation of recent hip replacement. They demonstrate a well positioned, well aligned hip replacement without complicating factors noted. In comparison with previous films there has been interval implant placement.    ASSESSMENT: 3.5 months status post right hip replacement.    PLAN: 1) Activity as tolerated   2) Continue hip strengthening exercises    3) Follow up 1 year post-op with repeat Xrays of right hip (2views AP Pelvis and lateral right hip)    Discussed antibiotic prophylaxis prior to any dental procedures or GI procedures.    Alphonse Messina MD  2025

## 2025-06-20 DIAGNOSIS — F41.9 ANXIETY: ICD-10-CM

## 2025-06-20 RX ORDER — VENLAFAXINE HYDROCHLORIDE 75 MG/1
75 CAPSULE, EXTENDED RELEASE ORAL DAILY
Qty: 90 CAPSULE | Refills: 0 | OUTPATIENT
Start: 2025-06-20

## 2025-07-10 RX ORDER — METOPROLOL SUCCINATE 25 MG/1
25 TABLET, EXTENDED RELEASE ORAL DAILY
Qty: 90 TABLET | Refills: 2 | Status: SHIPPED | OUTPATIENT
Start: 2025-07-10

## 2025-07-10 RX ORDER — LOSARTAN POTASSIUM 25 MG/1
25 TABLET ORAL NIGHTLY
Qty: 90 TABLET | Refills: 2 | Status: SHIPPED | OUTPATIENT
Start: 2025-07-10

## 2025-08-01 DIAGNOSIS — F41.9 ANXIETY: ICD-10-CM

## 2025-08-01 RX ORDER — VENLAFAXINE HYDROCHLORIDE 75 MG/1
75 CAPSULE, EXTENDED RELEASE ORAL DAILY
Qty: 90 CAPSULE | Refills: 0 | Status: SHIPPED | OUTPATIENT
Start: 2025-08-01

## 2025-08-27 ENCOUNTER — OFFICE VISIT (OUTPATIENT)
Dept: INTERNAL MEDICINE | Facility: CLINIC | Age: 80
End: 2025-08-27
Payer: MEDICARE

## 2025-08-27 VITALS
OXYGEN SATURATION: 94 % | BODY MASS INDEX: 23.86 KG/M2 | DIASTOLIC BLOOD PRESSURE: 62 MMHG | HEIGHT: 67 IN | WEIGHT: 152 LBS | SYSTOLIC BLOOD PRESSURE: 106 MMHG | HEART RATE: 66 BPM | TEMPERATURE: 98.1 F | RESPIRATION RATE: 18 BRPM

## 2025-08-27 DIAGNOSIS — E03.8 OTHER SPECIFIED HYPOTHYROIDISM: ICD-10-CM

## 2025-08-27 DIAGNOSIS — Z00.00 ENCOUNTER FOR SUBSEQUENT ANNUAL WELLNESS VISIT (AWV) IN MEDICARE PATIENT: Primary | ICD-10-CM

## 2025-08-27 DIAGNOSIS — I10 PRIMARY HYPERTENSION: ICD-10-CM

## 2025-08-27 LAB
BUN SERPL-MCNC: 17 MG/DL (ref 8–23)
BUN/CREAT SERPL: 20.2 (ref 7–25)
CALCIUM SERPL-MCNC: 10.1 MG/DL (ref 8.6–10.5)
CHLORIDE SERPL-SCNC: 102 MMOL/L (ref 98–107)
CO2 SERPL-SCNC: 26.3 MMOL/L (ref 22–29)
CREAT SERPL-MCNC: 0.84 MG/DL (ref 0.57–1)
EGFRCR SERPLBLD CKD-EPI 2021: 70.4 ML/MIN/1.73
ERYTHROCYTE [DISTWIDTH] IN BLOOD BY AUTOMATED COUNT: 13.6 % (ref 12.3–15.4)
GLUCOSE SERPL-MCNC: 87 MG/DL (ref 65–99)
HCT VFR BLD AUTO: 45.4 % (ref 34–46.6)
HGB BLD-MCNC: 14.8 G/DL (ref 12–15.9)
MCH RBC QN AUTO: 31.5 PG (ref 26.6–33)
MCHC RBC AUTO-ENTMCNC: 32.6 G/DL (ref 31.5–35.7)
MCV RBC AUTO: 96.6 FL (ref 79–97)
PLATELET # BLD AUTO: 362 10*3/MM3 (ref 140–450)
POTASSIUM SERPL-SCNC: 4.6 MMOL/L (ref 3.5–5.2)
RBC # BLD AUTO: 4.7 10*6/MM3 (ref 3.77–5.28)
SODIUM SERPL-SCNC: 140 MMOL/L (ref 136–145)
TSH SERPL DL<=0.005 MIU/L-ACNC: 1.11 UIU/ML (ref 0.27–4.2)
WBC # BLD AUTO: 8.18 10*3/MM3 (ref 3.4–10.8)

## 2025-08-27 RX ORDER — VIT C/E/ZN/COPPR/LUTEIN/ZEAXAN 250MG-90MG
CAPSULE ORAL
COMMUNITY

## (undated) DEVICE — SYS SKIN EXOFIN WND CLS 4X22CM

## (undated) DEVICE — SUT ETHIB 2 CV V37 MS/4 30IN MX69G

## (undated) DEVICE — ANTIBACTERIAL UNDYED BRAIDED (POLYGLACTIN 910), SYNTHETIC ABSORBABLE SUTURE: Brand: COATED VICRYL

## (undated) DEVICE — NEEDLE, QUINCKE, 20GX3.5": Brand: MEDLINE

## (undated) DEVICE — DRAPE,REIN 53X77,STERILE: Brand: MEDLINE

## (undated) DEVICE — TOWEL,OR,DSP,ST,BLUE,STD,4/PK,20PK/CS: Brand: MEDLINE

## (undated) DEVICE — MAT FLR ABSORBENT LG 4FT 10 2.5FT

## (undated) DEVICE — APPL DURAPREP IODOPHOR APL 26ML

## (undated) DEVICE — SOL ISO/ALC 70PCT 4OZ

## (undated) DEVICE — YANKAUER,BULB TIP,W/O VENT,RIGID,STERILE: Brand: MEDLINE

## (undated) DEVICE — PATIENT RETURN ELECTRODE, SINGLE-USE, CONTACT QUALITY MONITORING, ADULT, WITH 9FT CORD, FOR PATIENTS WEIGING OVER 33LBS. (15KG): Brand: MEGADYNE

## (undated) DEVICE — PK ANT HIP 40

## (undated) DEVICE — SPNG GZ WOVN 4X4IN 12PLY 10/BX STRL

## (undated) DEVICE — SYS IRR SURGIPHOR A/MIC RTU BO PVPI 450ML STRL

## (undated) DEVICE — 3M™ IOBAN™ 2 ANTIMICROBIAL INCISE DRAPE 6640EZ: Brand: IOBAN™ 2

## (undated) DEVICE — SKIN PREP TRAY 4 COMPARTM TRAY: Brand: MEDLINE INDUSTRIES, INC.

## (undated) DEVICE — PENCL SMOKE/EVAC MEGADYNE TELESCP 10FT

## (undated) DEVICE — DUAL CUT SAGITTAL BLADE

## (undated) DEVICE — GLV SURG BIOGEL LTX PF 8

## (undated) DEVICE — PICO 14 10CM X 20CM US: Brand: PICO 14

## (undated) DEVICE — DECANTER BAG 9": Brand: MEDLINE INDUSTRIES, INC.

## (undated) DEVICE — BLD DEBAKY BEAVER MAMMATOME 8MM

## (undated) DEVICE — BIT DRL RNGLC QC 3.2X30MM

## (undated) DEVICE — SPONGE,LAP,18"X18",DLX,XR,ST,5/PK,40/PK: Brand: MEDLINE

## (undated) DEVICE — PREP SOL POVIDONE/IODINE BT 4OZ

## (undated) DEVICE — WEREWOLF FASTSEAL 6.0 HEMOSTASIS WAND: Brand: FASTSEAL 6.0 HEMOSTASIS WAND

## (undated) DEVICE — DRAPE,U/ SHT,SPLIT,PLAS,STERIL: Brand: MEDLINE

## (undated) DEVICE — 450 ML BOTTLE OF 0.05% CHLORHEXIDINE GLUCONATE IN 99.95% STERILE WATER FOR IRRIGATION, USP AND APPLICATOR.: Brand: IRRISEPT ANTIMICROBIAL WOUND LAVAGE

## (undated) DEVICE — GLV SURG SENSICARE PI LF PF 8 GRN STRL

## (undated) DEVICE — THE STERILE LIGHT HANDLE COVER IS USED WITH STERIS SURGICAL LIGHTING AND VISUALIZATION SYSTEMS.

## (undated) DEVICE — TRAP FLD MINIVAC MEGADYNE 100ML